# Patient Record
Sex: FEMALE | Race: WHITE | NOT HISPANIC OR LATINO | Employment: OTHER | URBAN - METROPOLITAN AREA
[De-identification: names, ages, dates, MRNs, and addresses within clinical notes are randomized per-mention and may not be internally consistent; named-entity substitution may affect disease eponyms.]

---

## 2023-12-12 DIAGNOSIS — I10 ESSENTIAL (PRIMARY) HYPERTENSION: ICD-10-CM

## 2023-12-12 RX ORDER — HYDROCHLOROTHIAZIDE 12.5 MG/1
12.5 TABLET ORAL DAILY
Qty: 90 TABLET | Refills: 2 | Status: SHIPPED | OUTPATIENT
Start: 2023-12-12 | End: 2024-04-14 | Stop reason: HOSPADM

## 2023-12-12 RX ORDER — IRBESARTAN 300 MG/1
300 TABLET ORAL DAILY
Qty: 90 TABLET | Refills: 3 | Status: SHIPPED | OUTPATIENT
Start: 2023-12-12

## 2023-12-27 ENCOUNTER — OFFICE VISIT (OUTPATIENT)
Dept: PRIMARY CARE | Facility: CLINIC | Age: 63
End: 2023-12-27
Payer: COMMERCIAL

## 2023-12-27 VITALS
SYSTOLIC BLOOD PRESSURE: 126 MMHG | OXYGEN SATURATION: 97 % | HEIGHT: 64 IN | HEART RATE: 86 BPM | BODY MASS INDEX: 50.02 KG/M2 | TEMPERATURE: 97.8 F | RESPIRATION RATE: 16 BRPM | DIASTOLIC BLOOD PRESSURE: 80 MMHG | WEIGHT: 293 LBS

## 2023-12-27 DIAGNOSIS — I10 HYPERTENSION, ESSENTIAL, BENIGN: Primary | ICD-10-CM

## 2023-12-27 PROBLEM — D35.00 ADRENAL ADENOMA: Status: ACTIVE | Noted: 2023-12-27

## 2023-12-27 PROBLEM — N95.0 POST-MENOPAUSAL BLEEDING: Status: RESOLVED | Noted: 2023-12-27 | Resolved: 2023-12-27

## 2023-12-27 PROCEDURE — 3074F SYST BP LT 130 MM HG: CPT | Performed by: FAMILY MEDICINE

## 2023-12-27 PROCEDURE — 99213 OFFICE O/P EST LOW 20 MIN: CPT | Performed by: FAMILY MEDICINE

## 2023-12-27 PROCEDURE — 1036F TOBACCO NON-USER: CPT | Performed by: FAMILY MEDICINE

## 2023-12-27 PROCEDURE — 3079F DIAST BP 80-89 MM HG: CPT | Performed by: FAMILY MEDICINE

## 2023-12-27 ASSESSMENT — ENCOUNTER SYMPTOMS
CARDIOVASCULAR NEGATIVE: 1
PSYCHIATRIC NEGATIVE: 1
GASTROINTESTINAL NEGATIVE: 1
NEUROLOGICAL NEGATIVE: 1
EYES NEGATIVE: 1
ENDOCRINE NEGATIVE: 1
HYPERTENSION: 1
RESPIRATORY NEGATIVE: 1
CONSTITUTIONAL NEGATIVE: 1

## 2023-12-27 NOTE — PROGRESS NOTES
"Subjective     Patient ID: Libertad Rees is a 63 y.o. female who presents for Hypertension.  Hypertension  This is a chronic problem. The current episode started more than 1 year ago. The problem is unchanged. The problem is controlled. There are no known risk factors for coronary artery disease. The current treatment provides significant improvement.       Review of Systems   Constitutional: Negative.    HENT: Negative.     Eyes: Negative.    Respiratory: Negative.     Cardiovascular: Negative.    Gastrointestinal: Negative.    Endocrine: Negative.    Genitourinary: Negative.    Skin: Negative.    Neurological: Negative.    Psychiatric/Behavioral: Negative.         Objective     Vitals:    12/27/23 1512   Weight: 140 kg (309 lb)   Height: 1.626 m (5' 4\")        Current Outpatient Medications   Medication Instructions    hydroCHLOROthiazide (HYDRODIURIL) 12.5 mg, oral, Daily    irbesartan (AVAPRO) 300 mg, oral, Daily        Physical Exam  Constitutional:       Appearance: Normal appearance.   HENT:      Head: Normocephalic and atraumatic.      Right Ear: Tympanic membrane normal.      Left Ear: Tympanic membrane normal.      Nose: Nose normal.      Mouth/Throat:      Mouth: Mucous membranes are moist.   Cardiovascular:      Rate and Rhythm: Normal rate and regular rhythm.   Pulmonary:      Effort: Pulmonary effort is normal.      Breath sounds: Normal breath sounds.   Neurological:      Mental Status: She is alert.         Assessment/Plan   Problem List Items Addressed This Visit             ICD-10-CM    Hypertension, essential, benign - Primary I10   Continue current medications. Refills sent as needed.                        "

## 2024-01-19 ENCOUNTER — APPOINTMENT (OUTPATIENT)
Dept: PRIMARY CARE | Facility: CLINIC | Age: 64
End: 2024-01-19
Payer: COMMERCIAL

## 2024-02-15 ENCOUNTER — OFFICE VISIT (OUTPATIENT)
Dept: OBSTETRICS AND GYNECOLOGY | Facility: CLINIC | Age: 64
End: 2024-02-15
Payer: COMMERCIAL

## 2024-02-15 VITALS
BODY MASS INDEX: 50.02 KG/M2 | DIASTOLIC BLOOD PRESSURE: 80 MMHG | WEIGHT: 293 LBS | HEIGHT: 64 IN | SYSTOLIC BLOOD PRESSURE: 144 MMHG

## 2024-02-15 DIAGNOSIS — N95.0 PMB (POSTMENOPAUSAL BLEEDING): ICD-10-CM

## 2024-02-15 PROCEDURE — 99213 OFFICE O/P EST LOW 20 MIN: CPT | Performed by: OBSTETRICS & GYNECOLOGY

## 2024-02-15 PROCEDURE — 1036F TOBACCO NON-USER: CPT | Performed by: OBSTETRICS & GYNECOLOGY

## 2024-02-15 PROCEDURE — 58100 BIOPSY OF UTERUS LINING: CPT | Performed by: OBSTETRICS & GYNECOLOGY

## 2024-02-15 PROCEDURE — 3079F DIAST BP 80-89 MM HG: CPT | Performed by: OBSTETRICS & GYNECOLOGY

## 2024-02-15 PROCEDURE — 3077F SYST BP >= 140 MM HG: CPT | Performed by: OBSTETRICS & GYNECOLOGY

## 2024-02-15 RX ORDER — MISOPROSTOL 200 UG/1
TABLET ORAL
Qty: 2 TABLET | Refills: 0 | Status: SHIPPED | OUTPATIENT
Start: 2024-02-15 | End: 2024-04-25 | Stop reason: HOSPADM

## 2024-02-15 NOTE — PROGRESS NOTES
Patient ID: Libertad Rees is a 63 y.o. female.    Hx of PCOS and irreg menses up until late 50s    Started bleeding in Dec 2 weeks . Then cont into Jan 2 more weeks.      ProceduresCould not do EMB due to cervix stenosis. Tried x 3 today w no success.    Will order 400 mg cytotec. Return for me to insert then next day try EMB.  Pt agrees.    Melony Dorantes MD

## 2024-02-28 ENCOUNTER — APPOINTMENT (OUTPATIENT)
Dept: RADIOLOGY | Facility: HOSPITAL | Age: 64
End: 2024-02-28
Payer: COMMERCIAL

## 2024-03-04 ENCOUNTER — OFFICE VISIT (OUTPATIENT)
Dept: OBSTETRICS AND GYNECOLOGY | Facility: CLINIC | Age: 64
End: 2024-03-04
Payer: COMMERCIAL

## 2024-03-04 VITALS
BODY MASS INDEX: 50.02 KG/M2 | DIASTOLIC BLOOD PRESSURE: 84 MMHG | HEIGHT: 64 IN | WEIGHT: 293 LBS | SYSTOLIC BLOOD PRESSURE: 134 MMHG

## 2024-03-04 DIAGNOSIS — N95.0 PMB (POSTMENOPAUSAL BLEEDING): Primary | ICD-10-CM

## 2024-03-04 PROCEDURE — 1036F TOBACCO NON-USER: CPT | Performed by: OBSTETRICS & GYNECOLOGY

## 2024-03-04 PROCEDURE — 99212 OFFICE O/P EST SF 10 MIN: CPT | Performed by: OBSTETRICS & GYNECOLOGY

## 2024-03-04 PROCEDURE — 3075F SYST BP GE 130 - 139MM HG: CPT | Performed by: OBSTETRICS & GYNECOLOGY

## 2024-03-04 PROCEDURE — 3079F DIAST BP 80-89 MM HG: CPT | Performed by: OBSTETRICS & GYNECOLOGY

## 2024-03-04 NOTE — PROGRESS NOTES
Subjective   Patient ID: Libertad Rees is a 64 y.o. female who presents for Postmenopausal bleeding.     Started bleeding in Dec 2 weeks . Then cont into Jan 2 more weeks.  Had cervical stenosis last visit and could not get endometrial bx done.   Picked up rx of cytotec tablets fr her pharmacy and here today to have  Insert of Cytotec for EMB tomorrow morning.       Objective   Physical Exam  Speculum inserted and two cytotec tablets 200 mcg each inserted.     Assessment/Plan   Diagnoses and all orders for this visit:  PMB (postmenopausal bleeding)     Follow up scheduled in am for EMB     MD Felisa Garcia CMA 03/04/24 4:42 PM

## 2024-03-05 ENCOUNTER — PREP FOR PROCEDURE (OUTPATIENT)
Dept: OBSTETRICS AND GYNECOLOGY | Facility: CLINIC | Age: 64
End: 2024-03-05

## 2024-03-05 ENCOUNTER — OFFICE VISIT (OUTPATIENT)
Dept: OBSTETRICS AND GYNECOLOGY | Facility: CLINIC | Age: 64
End: 2024-03-05
Payer: COMMERCIAL

## 2024-03-05 VITALS
DIASTOLIC BLOOD PRESSURE: 80 MMHG | SYSTOLIC BLOOD PRESSURE: 142 MMHG | HEIGHT: 64 IN | WEIGHT: 293 LBS | BODY MASS INDEX: 50.02 KG/M2

## 2024-03-05 DIAGNOSIS — N95.0 POSTMENOPAUSAL BLEEDING: Primary | ICD-10-CM

## 2024-03-05 PROCEDURE — 3079F DIAST BP 80-89 MM HG: CPT | Performed by: OBSTETRICS & GYNECOLOGY

## 2024-03-05 PROCEDURE — 99212 OFFICE O/P EST SF 10 MIN: CPT | Performed by: OBSTETRICS & GYNECOLOGY

## 2024-03-05 PROCEDURE — 3077F SYST BP >= 140 MM HG: CPT | Performed by: OBSTETRICS & GYNECOLOGY

## 2024-03-05 PROCEDURE — 3008F BODY MASS INDEX DOCD: CPT | Performed by: OBSTETRICS & GYNECOLOGY

## 2024-03-05 PROCEDURE — 1036F TOBACCO NON-USER: CPT | Performed by: OBSTETRICS & GYNECOLOGY

## 2024-03-05 NOTE — PROGRESS NOTES
Patient ID: Libertad Rees is a 64 y.o. female.  Has 2 months of postmenopausal bleeding. Has not yet done the pelvic US I ordered, enc to do so .  I placed  cytotec yest in vagina in hopes of cervical dilation. Last try had cervical stenosis.    Tried again x 3 to dilate cervix for pipelle and was unsuccessful.  Firm stenotic scar tissue and BMI limits exam in office. Cervix is high up in vagina approx 6 inches from introitus.    Unable to do / cancelled  Endometrial biopsy    Date/Time: 3/5/2024 8:33 AM    Performed by: Melony Dorantes MD  Authorized by: Melony Dorantes MD        PLAN:   Schedule D&C, Hysteroscopy  Needs to get pelvic Us DONE .    Pt aware and agrees.    Melony Dorantes MD

## 2024-03-08 ENCOUNTER — APPOINTMENT (OUTPATIENT)
Dept: PRIMARY CARE | Facility: CLINIC | Age: 64
End: 2024-03-08
Payer: COMMERCIAL

## 2024-03-13 ENCOUNTER — APPOINTMENT (OUTPATIENT)
Dept: RADIOLOGY | Facility: HOSPITAL | Age: 64
End: 2024-03-13
Payer: COMMERCIAL

## 2024-03-14 ENCOUNTER — HOSPITAL ENCOUNTER (OUTPATIENT)
Facility: HOSPITAL | Age: 64
Setting detail: OUTPATIENT SURGERY
End: 2024-03-14
Attending: OBSTETRICS & GYNECOLOGY | Admitting: OBSTETRICS & GYNECOLOGY
Payer: COMMERCIAL

## 2024-03-19 ENCOUNTER — HOSPITAL ENCOUNTER (OUTPATIENT)
Dept: RADIOLOGY | Facility: CLINIC | Age: 64
Discharge: HOME | End: 2024-03-19
Payer: COMMERCIAL

## 2024-03-19 DIAGNOSIS — N95.0 PMB (POSTMENOPAUSAL BLEEDING): ICD-10-CM

## 2024-03-19 PROCEDURE — 76830 TRANSVAGINAL US NON-OB: CPT

## 2024-03-19 PROCEDURE — 76830 TRANSVAGINAL US NON-OB: CPT | Performed by: RADIOLOGY

## 2024-03-19 PROCEDURE — 76856 US EXAM PELVIC COMPLETE: CPT | Performed by: RADIOLOGY

## 2024-03-29 ENCOUNTER — OFFICE VISIT (OUTPATIENT)
Dept: PRIMARY CARE | Facility: CLINIC | Age: 64
End: 2024-03-29
Payer: COMMERCIAL

## 2024-03-29 VITALS
HEIGHT: 64 IN | HEART RATE: 88 BPM | SYSTOLIC BLOOD PRESSURE: 134 MMHG | RESPIRATION RATE: 16 BRPM | WEIGHT: 293 LBS | DIASTOLIC BLOOD PRESSURE: 82 MMHG | OXYGEN SATURATION: 98 % | BODY MASS INDEX: 50.02 KG/M2 | TEMPERATURE: 97.8 F

## 2024-03-29 DIAGNOSIS — Z12.31 ENCOUNTER FOR SCREENING MAMMOGRAM FOR BREAST CANCER: ICD-10-CM

## 2024-03-29 DIAGNOSIS — Z00.00 HEALTHCARE MAINTENANCE: Primary | ICD-10-CM

## 2024-03-29 DIAGNOSIS — Z12.11 ENCOUNTER FOR SCREENING FOR MALIGNANT NEOPLASM OF COLON: ICD-10-CM

## 2024-03-29 LAB
POC APPEARANCE, URINE: CLEAR
POC BILIRUBIN, URINE: NEGATIVE
POC BLOOD, URINE: NEGATIVE
POC COLOR, URINE: YELLOW
POC GLUCOSE, URINE: NEGATIVE MG/DL
POC KETONES, URINE: NEGATIVE MG/DL
POC LEUKOCYTES, URINE: NEGATIVE
POC NITRITE,URINE: NEGATIVE
POC PH, URINE: 6 PH
POC PROTEIN, URINE: NEGATIVE MG/DL
POC SPECIFIC GRAVITY, URINE: 1.01
POC UROBILINOGEN, URINE: 0.2 EU/DL

## 2024-03-29 PROCEDURE — 81002 URINALYSIS NONAUTO W/O SCOPE: CPT | Performed by: FAMILY MEDICINE

## 2024-03-29 PROCEDURE — 3008F BODY MASS INDEX DOCD: CPT | Performed by: FAMILY MEDICINE

## 2024-03-29 PROCEDURE — 1036F TOBACCO NON-USER: CPT | Performed by: FAMILY MEDICINE

## 2024-03-29 PROCEDURE — 93000 ELECTROCARDIOGRAM COMPLETE: CPT | Performed by: FAMILY MEDICINE

## 2024-03-29 PROCEDURE — 3075F SYST BP GE 130 - 139MM HG: CPT | Performed by: FAMILY MEDICINE

## 2024-03-29 PROCEDURE — 3079F DIAST BP 80-89 MM HG: CPT | Performed by: FAMILY MEDICINE

## 2024-03-29 PROCEDURE — 99396 PREV VISIT EST AGE 40-64: CPT | Performed by: FAMILY MEDICINE

## 2024-03-29 ASSESSMENT — ENCOUNTER SYMPTOMS
GASTROINTESTINAL NEGATIVE: 1
CARDIOVASCULAR NEGATIVE: 1
CONSTITUTIONAL NEGATIVE: 1
PSYCHIATRIC NEGATIVE: 1
ENDOCRINE NEGATIVE: 1
EYES NEGATIVE: 1
RESPIRATORY NEGATIVE: 1
NEUROLOGICAL NEGATIVE: 1

## 2024-03-29 ASSESSMENT — COLUMBIA-SUICIDE SEVERITY RATING SCALE - C-SSRS
6. HAVE YOU EVER DONE ANYTHING, STARTED TO DO ANYTHING, OR PREPARED TO DO ANYTHING TO END YOUR LIFE?: NO
1. IN THE PAST MONTH, HAVE YOU WISHED YOU WERE DEAD OR WISHED YOU COULD GO TO SLEEP AND NOT WAKE UP?: NO
2. HAVE YOU ACTUALLY HAD ANY THOUGHTS OF KILLING YOURSELF?: NO

## 2024-03-29 ASSESSMENT — PATIENT HEALTH QUESTIONNAIRE - PHQ9
2. FEELING DOWN, DEPRESSED OR HOPELESS: NOT AT ALL
1. LITTLE INTEREST OR PLEASURE IN DOING THINGS: NOT AT ALL
SUM OF ALL RESPONSES TO PHQ9 QUESTIONS 1 AND 2: 0

## 2024-03-29 NOTE — PROGRESS NOTES
"Subjective     Patient ID: Libertad Rees is a 64 y.o. female who presents for Annual Exam.  HPI  Having a D&C on 4/25.    Review of Systems   Constitutional: Negative.    HENT: Negative.     Eyes: Negative.    Respiratory: Negative.     Cardiovascular: Negative.    Gastrointestinal: Negative.    Endocrine: Negative.    Genitourinary: Negative.    Skin: Negative.    Neurological: Negative.    Psychiatric/Behavioral: Negative.         Objective     Vitals:    03/29/24 1057   BP: 134/82   BP Location: Left arm   Patient Position: Sitting   Pulse: 88   Resp: 16   Temp: 36.6 °C (97.8 °F)   SpO2: 98%   Weight: 137 kg (301 lb 1.6 oz)   Height: 1.626 m (5' 4\")        Current Outpatient Medications   Medication Instructions    hydroCHLOROthiazide (MICROZIDE) 12.5 mg, oral, Daily    irbesartan (AVAPRO) 300 mg, oral, Daily    miSOPROStoL (Cytotec) 200 mcg tablet Insert 2 tablets in vagina prior to procedure        Physical Exam  Constitutional:       General: She is not in acute distress.     Appearance: Normal appearance.   HENT:      Head: Normocephalic and atraumatic.      Right Ear: Tympanic membrane normal.      Left Ear: Tympanic membrane normal.      Nose: Nose normal.      Mouth/Throat:      Pharynx: Oropharynx is clear.   Eyes:      Conjunctiva/sclera: Conjunctivae normal.      Pupils: Pupils are equal, round, and reactive to light.   Neck:      Vascular: No carotid bruit.   Cardiovascular:      Rate and Rhythm: Normal rate and regular rhythm.      Heart sounds: Normal heart sounds.   Pulmonary:      Effort: Pulmonary effort is normal.      Breath sounds: Normal breath sounds.   Abdominal:      General: Bowel sounds are normal.      Palpations: Abdomen is soft.   Musculoskeletal:      Cervical back: Neck supple. No tenderness.   Skin:     General: Skin is warm and dry.   Neurological:      General: No focal deficit present.      Mental Status: She is alert.   Psychiatric:         Mood and Affect: Mood normal.        "  Behavior: Behavior normal.         Assessment/Plan   Problem List Items Addressed This Visit    None  Visit Diagnoses         Codes    Healthcare maintenance    -  Primary Z00.00    Relevant Orders    ECG 12 lead (Clinic Performed) (Completed)    POCT UA (nonautomated) manually resulted (Completed)    CBC    Comprehensive Metabolic Panel    Lipid Panel    Encounter for screening for malignant neoplasm of colon     Z12.11    Relevant Orders    Cologuard® colon cancer screening    Encounter for screening mammogram for breast cancer     Z12.31    Relevant Orders    BI mammo bilateral screening tomosynthesis

## 2024-04-11 ENCOUNTER — PRE-ADMISSION TESTING (OUTPATIENT)
Dept: PREADMISSION TESTING | Facility: HOSPITAL | Age: 64
End: 2024-04-11
Payer: COMMERCIAL

## 2024-04-11 ENCOUNTER — LAB (OUTPATIENT)
Dept: LAB | Facility: LAB | Age: 64
End: 2024-04-11
Payer: COMMERCIAL

## 2024-04-11 VITALS
RESPIRATION RATE: 16 BRPM | DIASTOLIC BLOOD PRESSURE: 84 MMHG | OXYGEN SATURATION: 93 % | SYSTOLIC BLOOD PRESSURE: 178 MMHG | HEART RATE: 88 BPM | HEIGHT: 64 IN | BODY MASS INDEX: 50.02 KG/M2 | WEIGHT: 293 LBS | TEMPERATURE: 98.1 F

## 2024-04-11 DIAGNOSIS — N95.0 POSTMENOPAUSAL BLEEDING: ICD-10-CM

## 2024-04-11 DIAGNOSIS — Z01.818 PRE-OP TESTING: Primary | ICD-10-CM

## 2024-04-11 DIAGNOSIS — Z00.00 HEALTHCARE MAINTENANCE: ICD-10-CM

## 2024-04-11 LAB
ALBUMIN SERPL BCP-MCNC: 4.2 G/DL (ref 3.4–5)
ALP SERPL-CCNC: 103 U/L (ref 33–136)
ALT SERPL W P-5'-P-CCNC: 14 U/L (ref 7–45)
ANION GAP SERPL CALC-SCNC: 13 MMOL/L (ref 10–20)
AST SERPL W P-5'-P-CCNC: 16 U/L (ref 9–39)
BILIRUB SERPL-MCNC: 1.9 MG/DL (ref 0–1.2)
BUN SERPL-MCNC: 15 MG/DL (ref 6–23)
CALCIUM SERPL-MCNC: 10.2 MG/DL (ref 8.6–10.3)
CHLORIDE SERPL-SCNC: 100 MMOL/L (ref 98–107)
CHOLEST SERPL-MCNC: 214 MG/DL (ref 0–199)
CHOLESTEROL/HDL RATIO: 4.9
CO2 SERPL-SCNC: 31 MMOL/L (ref 21–32)
CREAT SERPL-MCNC: 0.6 MG/DL (ref 0.5–1.05)
EGFRCR SERPLBLD CKD-EPI 2021: >90 ML/MIN/1.73M*2
ERYTHROCYTE [DISTWIDTH] IN BLOOD BY AUTOMATED COUNT: 13.7 % (ref 11.5–14.5)
GLUCOSE SERPL-MCNC: 114 MG/DL (ref 74–99)
HCT VFR BLD AUTO: 48.6 % (ref 36–46)
HDLC SERPL-MCNC: 44 MG/DL
HGB BLD-MCNC: 15.4 G/DL (ref 12–16)
LDLC SERPL CALC-MCNC: 145 MG/DL
MCH RBC QN AUTO: 28.5 PG (ref 26–34)
MCHC RBC AUTO-ENTMCNC: 31.7 G/DL (ref 32–36)
MCV RBC AUTO: 90 FL (ref 80–100)
NON HDL CHOLESTEROL: 170 MG/DL (ref 0–149)
NRBC BLD-RTO: 0 /100 WBCS (ref 0–0)
PLATELET # BLD AUTO: 325 X10*3/UL (ref 150–450)
POTASSIUM SERPL-SCNC: 3.7 MMOL/L (ref 3.5–5.3)
PROT SERPL-MCNC: 7.5 G/DL (ref 6.4–8.2)
RBC # BLD AUTO: 5.41 X10*6/UL (ref 4–5.2)
SODIUM SERPL-SCNC: 140 MMOL/L (ref 136–145)
TRIGL SERPL-MCNC: 125 MG/DL (ref 0–149)
VLDL: 25 MG/DL (ref 0–40)
WBC # BLD AUTO: 8.8 X10*3/UL (ref 4.4–11.3)

## 2024-04-11 PROCEDURE — 36415 COLL VENOUS BLD VENIPUNCTURE: CPT

## 2024-04-11 PROCEDURE — 99203 OFFICE O/P NEW LOW 30 MIN: CPT | Performed by: NURSE PRACTITIONER

## 2024-04-11 PROCEDURE — 80053 COMPREHEN METABOLIC PANEL: CPT

## 2024-04-11 PROCEDURE — 86850 RBC ANTIBODY SCREEN: CPT

## 2024-04-11 PROCEDURE — 80061 LIPID PANEL: CPT

## 2024-04-11 PROCEDURE — 86901 BLOOD TYPING SEROLOGIC RH(D): CPT

## 2024-04-11 PROCEDURE — 86900 BLOOD TYPING SEROLOGIC ABO: CPT

## 2024-04-11 PROCEDURE — 85027 COMPLETE CBC AUTOMATED: CPT

## 2024-04-11 ASSESSMENT — PAIN - FUNCTIONAL ASSESSMENT: PAIN_FUNCTIONAL_ASSESSMENT: 0-10

## 2024-04-11 ASSESSMENT — DUKE ACTIVITY SCORE INDEX (DASI)
CAN YOU CLIMB A FLIGHT OF STAIRS OR WALK UP A HILL: YES
CAN YOU HAVE SEXUAL RELATIONS: NO
DASI METS SCORE: 6.6
CAN YOU RUN A SHORT DISTANCE: NO
TOTAL_SCORE: 31.45
CAN YOU DO MODERATE WORK AROUND THE HOUSE LIKE VACUUMING, SWEEPING FLOORS OR CARRYING GROCERIES: YES
CAN YOU DO LIGHT WORK AROUND THE HOUSE LIKE DUSTING OR WASHING DISHES: YES
CAN YOU WALK INDOORS, SUCH AS AROUND YOUR HOUSE: YES
CAN YOU TAKE CARE OF YOURSELF (EAT, DRESS, BATHE, OR USE TOILET): YES
CAN YOU DO HEAVY WORK AROUND THE HOUSE LIKE SCRUBBING FLOORS OR LIFTING AND MOVING HEAVY FURNITURE: YES
CAN YOU PARTICIPATE IN STRENOUS SPORTS LIKE SWIMMING, SINGLES TENNIS, FOOTBALL, BASKETBALL, OR SKIING: NO
CAN YOU PARTICIPATE IN MODERATE RECREATIONAL ACTIVITIES LIKE GOLF, BOWLING, DANCING, DOUBLES TENNIS OR THROWING A BASEBALL OR FOOTBALL: NO
CAN YOU WALK A BLOCK OR TWO ON LEVEL GROUND: YES
CAN YOU DO YARD WORK LIKE RAKING LEAVES, WEEDING OR PUSHING A MOWER: YES

## 2024-04-11 ASSESSMENT — CHADS2 SCORE
PRIOR STROKE OR TIA OR THROMBOEMBOLISM: NO
DIABETES: NO
HYPERTENSION: YES
CHF: NO
AGE GREATER THAN OR EQUAL TO 75: NO
CHADS2 SCORE: 1

## 2024-04-11 ASSESSMENT — ACTIVITIES OF DAILY LIVING (ADL): ADL_SCORE: 0

## 2024-04-11 ASSESSMENT — PAIN SCALES - GENERAL: PAINLEVEL_OUTOF10: 0 - NO PAIN

## 2024-04-11 ASSESSMENT — LIFESTYLE VARIABLES: SMOKING_STATUS: NONSMOKER

## 2024-04-11 NOTE — PREPROCEDURE INSTRUCTIONS
Medication List            Accurate as of April 11, 2024  1:52 PM. Always use your most recent med list.                hydroCHLOROthiazide 12.5 mg tablet  Commonly known as: Microzide  TAKE 1 TABLET BY MOUTH EVERY DAY  Medication Adjustments for Surgery: Take morning of surgery with sip of water, no other fluids     irbesartan 300 mg tablet  Commonly known as: Avapro  TAKE 1 TABLET BY MOUTH EVERY DAY  Medication Adjustments for Surgery: Other (Comment)  Notes to patient: HOLD any evening dose the night before the day of surgery  HOLD the day of surgery     miSOPROStoL 200 mcg tablet  Commonly known as: Cytotec  Insert 2 tablets in vagina prior to procedure  Medication Adjustments for Surgery: Continue until night before surgery                                  PRE-OPERATIVE INSTRUCTIONS    You will receive notification one business day prior to your procedure to confirm your arrival time. It is important that you answer your phone and/or check your messages during this time. If you do not hear from the surgery center by 5 pm. the day before your procedure, please call [phone].     Please enter the building through the Outpatient entrance and take the elevator off the lobby to the 2nd floor then check in at the Outpatient Surgery desk on the 2nd floor.    INSTRUCTIONS:  Talk to your surgeon for instructions if you should stop your aspirin, blood thinner, or diabetes medicines.  DO NOT take any multivitamins or over the counter supplements for 7-10 days before surgery.  If not being admitted, you must have an adult immediately available to drive you home after surgery. We also highly recommend you have someone stay with you for the entire day and night of your surgery.  For children having surgery, a parent or legal guardian must accompany them to the surgery center. If this is not possible, please call [phone] to make additional arrangements.  For adults who are unable to consent or make medical  decisions for themselves, a legal guardian or Power of  must accompany them to the surgery center. If this is not possible, please call [phone] to make additional arrangements.  Wear comfortable, loose fitting clothing.  All jewelry and piercings must be removed. If you are unable to remove an item or have a dermal piercing, please be sure to tell the nurse when you arrive for surgery.  Nail polish and make-up must be removed.  Avoid smoking or consuming alcohol for 24 hours before surgery.  To help prevent infection, please take a shower/bath and wash your hair the night before and/or morning of surgery (or follow other specific bathing instructions provided).    Preoperative Fasting Guidelines    Why must I stop eating and drinking near surgery time?  With sedation, food or liquid in your stomach can enter your lungs causing serious complications  Increases nausea and vomiting    When do I need to stop eating and drinking before my surgery?  Do not eat any solid food after midnight the night before your surgery/procedure.  You may have up to TEN ounces of clear liquid until TWO hours before your instructed arrival time to the hospital.  This includes water, black tea/coffee, (no milk or cream) apple juice, and electrolyte drinks (Gatorade).   You may chew gum until TWO hours before your surgery/procedure    If you have any questions or concerns, please call Pre-Admission Testing at [phone].                     Home Preoperative Antibacterial Shower with Chlorhexidine gluconate (CHG)     What is a home preoperative antibacterial shower?  This shower is a way of cleaning the skin with a germ killing solution before surgery. The solution contains chlorhexidine gluconate, commonly known as CHG. CHG is a skin cleanser with germ killing ability. Let your doctor know if you are allergic to chlorhexidine.    Why do I need to take a preoperative antibacterial shower?  Skin is not sterile. It is best to  try to make your skin as free of germs as possible before surgery. Proper cleansing with a germ killing soap before surgery can lower the number of germs on your skin. This helps to reduce the risk of infection at the surgical site. Following the instructions listed below will help you prepare your skin for surgery.    How do I use the solution?  Begin using your CHG soap the night before and again the morning of your procedure.   Do not shave the day before or day of surgery.  Remove all jewelry until after surgery. Take off rings and take out all body-piercing jewelry.  Wash your face and hair with normal soap and shampoo before you use the CHG soap.  Apply the CHG solution to a clean wet washcloth. Move away from the water to avoid premature rinsing of the CHG soap as you are applying. Firmly lather your entire body from the neck down. Do not use CHG on your face, eyes, ears, or genitals.   Pay special attention to the area where your incisions will be located.  Do not scrub your skin too hard.  It is important to allow the CHG soap to sit on your skin for 3-5 minutes.  Rinse the solution off your body completely. Do not wash with your normal soap after the CHG soap solution.  Pat yourself dry with a clean, soft towel.  Do not apply powders, lotions or deodorants as these might block how the CHG soap works.   Dress in clean clothing.  Be sure to sleep with clean, freshly laundered sheets.  Be aware that CHG can cause stains on fabric. Rinse your washcloth and other linens that have contact with CHG completely. Use only non-chlorine detergents to launder the items used.

## 2024-04-11 NOTE — CPM/PAT H&P
CPM/PAT Evaluation       Name: Libertad Rees (Libertad Rees)  /Age: 1960/64 y.o.     In-Person       Chief Complaint: post menopausal bleeding    HPI    RICCI is a 63 yo female who has had issues with post menopausal bleeding over one year that resolved spontaneously and now more recently bleeding returned. She was seen by OBGYN and scheduled for hysteroscopy for further evaluation. She denies any known uterine polyps or fibroids. Denies any recent UTIs. Otherwise denies any recent illness, fever/chills, chest pains or shortness of breath.     Past Medical History:   Diagnosis Date    Adrenal adenoma     Adverse effect of anesthesia     low oxygen levels postop    Hypertension     Lipoma     Nephrolithiasis     Post-menopausal bleeding 2023     PCP: Dr. Levine    Past Surgical History:   Procedure Laterality Date    LIPOMA RESECTION      LITHOTRIPSY      OTHER SURGICAL HISTORY  2022    Thoracotomy    SHOULDER SURGERY Right        Patient  has no history on file for sexual activity.    Family History   Problem Relation Name Age of Onset    Hypertension Mother      Stroke Mother      Diabetes Brother      Hypertension Brother         Allergies   Allergen Reactions    Banana Unknown    Kiwi Unknown    Latex Rash       Prior to Admission medications    Medication Sig Start Date End Date Taking? Authorizing Provider   hydroCHLOROthiazide (HYDRODiuril) 12.5 mg tablet TAKE 1 TABLET BY MOUTH EVERY DAY 23   Stephenie Levine DO   irbesartan (Avapro) 300 mg tablet TAKE 1 TABLET BY MOUTH EVERY DAY 23   Stephenie Levine DO   miSOPROStoL (Cytotec) 200 mcg tablet Insert 2 tablets in vagina prior to procedure 2/15/24   Melony Dorantes MD        PAT ROS   Constitutional: Negative for fever, chills, or sweats   ENMT: Negative for nasal discharge, congestion, ear pain, mouth pain, throat pain   Respiratory: Negative for cough, wheezing, shortness of breath   Cardiac: Negative for chest pain,  dyspnea on exertion, palpitations   Gastrointestinal: Negative for nausea, vomiting, diarrhea, constipation, abdominal pain    Genitourinary: Negative for dysuria, flank pains, frequency, hematuria   Positive intermittent uterine bleeding      Musculoskeletal: Negative for decreased ROM, swelling, weakness; Positive occasional left mid back pains    Neurological: Negative for dizziness, confusion, headache  Psychiatric: Negative for mood changes   Skin: Negative for itching, rash, ulcer    Hematologic/Lymph: Negative for bruising, easy bleeding  Allergic/Immunologic: Negative itching, sneezing, swelling      Physical Exam  Constitutional:       Appearance: She is obese.   HENT:      Head: Normocephalic.      Mouth/Throat:      Mouth: Mucous membranes are moist.   Eyes:      Extraocular Movements: Extraocular movements intact.   Cardiovascular:      Rate and Rhythm: Normal rate and regular rhythm.   Pulmonary:      Effort: Pulmonary effort is normal.      Breath sounds: Normal breath sounds.   Abdominal:      General: Abdomen is flat.      Palpations: Abdomen is soft.   Musculoskeletal:         General: Normal range of motion.      Cervical back: Normal range of motion.   Skin:     General: Skin is warm and dry.   Neurological:      General: No focal deficit present.      Mental Status: She is alert.   Psychiatric:         Mood and Affect: Mood normal.          PAT AIRWAY:   Airway:     Neck ROM::  Full   Missing teeth from extractions  normal      Anesthesia:  Patient denies any anesthesia complications.     Visit Vitals  /84   Pulse 88   Temp 36.7 °C (98.1 °F) (Temporal)   Resp 16       DASI Risk Score      Flowsheet Row Most Recent Value   DASI SCORE 31.45   METS Score (Will be calculated only when all the questions are answered) 6.6          Caprini DVT Assessment      Flowsheet Row Most Recent Value   DVT Score 10   Current Status Major surgery planned, including arthroscopic and laproscopic (1-2 hours)    History Previous malignancy, Prior major surgery   Age 60-75 years   BMI Greater than 50 (Venous stasis syndrome)          Modified Frailty Index      Flowsheet Row Most Recent Value   Modified Frailty Index Calculator .0909          CHADS2 Stroke Risk  Current as of 8 minutes ago        N/A 3 to 100%: High Risk   2 to < 3%: Medium Risk   0 to < 2%: Low Risk     Last Change: N/A          This score determines the patient's risk of having a stroke if the patient has atrial fibrillation.        This score is not applicable to this patient. Components are not calculated.          Revised Cardiac Risk Index    No data to display       Apfel Simplified Score    No data to display       Risk Analysis Index Results This Encounter         4/11/2024  1323             FU Cancer History: Patient does not indicate history of cancer    Total Risk Analysis Index Score Without Cancer: 19    Total Risk Analysis Index Score: 19          Stop Bang Score      Flowsheet Row Most Recent Value   Do you snore loudly? 1   Do you often feel tired or fatigued after your sleep? 1   Has anyone ever observed you stop breathing in your sleep? 0   Do you have or are you being treated for high blood pressure? 1   Recent BMI (Calculated) 51.7   Is BMI greater than 35 kg/m2? 1=Yes   Age older than 50 years old? 1=Yes   Is your neck circumference greater than 17 inches (Male) or 16 inches (Female)? 1   Gender - Male 0=No   STOP-BANG Total Score 6            Assessment and Plan:     63 yo female scheduled for hysteroscopy on 4/25/2024 with Dr. Dorantes. Blood work ordered. EKG completed on 3/29/2024 and on file under cardiology. Otherwise no further orders indicated.     See risk scores as previously documented.

## 2024-04-12 LAB
ABO GROUP (TYPE) IN BLOOD: NORMAL
ANTIBODY SCREEN: NORMAL
RH FACTOR (ANTIGEN D): NORMAL

## 2024-04-13 ENCOUNTER — APPOINTMENT (OUTPATIENT)
Dept: RADIOLOGY | Facility: HOSPITAL | Age: 64
End: 2024-04-13
Payer: COMMERCIAL

## 2024-04-13 ENCOUNTER — HOSPITAL ENCOUNTER (OUTPATIENT)
Facility: HOSPITAL | Age: 64
Setting detail: OBSERVATION
Discharge: HOME | End: 2024-04-14
Attending: EMERGENCY MEDICINE | Admitting: INTERNAL MEDICINE
Payer: COMMERCIAL

## 2024-04-13 ENCOUNTER — APPOINTMENT (OUTPATIENT)
Dept: CARDIOLOGY | Facility: HOSPITAL | Age: 64
End: 2024-04-13
Payer: COMMERCIAL

## 2024-04-13 DIAGNOSIS — I10 HYPERTENSION, ESSENTIAL, BENIGN: ICD-10-CM

## 2024-04-13 DIAGNOSIS — G47.33 OSA (OBSTRUCTIVE SLEEP APNEA): ICD-10-CM

## 2024-04-13 DIAGNOSIS — R09.02 HYPOXIA: ICD-10-CM

## 2024-04-13 DIAGNOSIS — R09.02 HYPOXEMIA: ICD-10-CM

## 2024-04-13 DIAGNOSIS — N20.1 URETERAL STONE: Primary | ICD-10-CM

## 2024-04-13 DIAGNOSIS — I27.20 PULMONARY HYPERTENSION (MULTI): ICD-10-CM

## 2024-04-13 DIAGNOSIS — G47.30 SLEEP APNEA, UNSPECIFIED: ICD-10-CM

## 2024-04-13 DIAGNOSIS — J96.01 ACUTE RESPIRATORY FAILURE WITH HYPOXEMIA (MULTI): ICD-10-CM

## 2024-04-13 DIAGNOSIS — I51.7 CARDIOMEGALY: ICD-10-CM

## 2024-04-13 DIAGNOSIS — R11.0 NAUSEA: ICD-10-CM

## 2024-04-13 PROBLEM — E66.01 MORBID OBESITY (MULTI): Status: ACTIVE | Noted: 2024-04-13

## 2024-04-13 PROBLEM — N20.0 KIDNEY STONE: Status: ACTIVE | Noted: 2024-04-13

## 2024-04-13 LAB
ALBUMIN SERPL BCP-MCNC: 3.9 G/DL (ref 3.4–5)
ALP SERPL-CCNC: 95 U/L (ref 33–136)
ALT SERPL W P-5'-P-CCNC: 12 U/L (ref 7–45)
ANION GAP SERPL CALC-SCNC: 13 MMOL/L (ref 10–20)
APPEARANCE UR: CLEAR
AST SERPL W P-5'-P-CCNC: 16 U/L (ref 9–39)
BASOPHILS # BLD AUTO: 0.03 X10*3/UL (ref 0–0.1)
BASOPHILS NFR BLD AUTO: 0.2 %
BILIRUB SERPL-MCNC: 0.9 MG/DL (ref 0–1.2)
BILIRUB UR STRIP.AUTO-MCNC: NEGATIVE MG/DL
BUN SERPL-MCNC: 18 MG/DL (ref 6–23)
CALCIUM SERPL-MCNC: 9.7 MG/DL (ref 8.6–10.3)
CARDIAC TROPONIN I PNL SERPL HS: 8 NG/L (ref 0–13)
CARDIAC TROPONIN I PNL SERPL HS: 9 NG/L (ref 0–13)
CHLORIDE SERPL-SCNC: 100 MMOL/L (ref 98–107)
CO2 SERPL-SCNC: 31 MMOL/L (ref 21–32)
COLOR UR: YELLOW
CREAT SERPL-MCNC: 0.64 MG/DL (ref 0.5–1.05)
EGFRCR SERPLBLD CKD-EPI 2021: >90 ML/MIN/1.73M*2
EOSINOPHIL # BLD AUTO: 0.04 X10*3/UL (ref 0–0.7)
EOSINOPHIL NFR BLD AUTO: 0.3 %
ERYTHROCYTE [DISTWIDTH] IN BLOOD BY AUTOMATED COUNT: 13.8 % (ref 11.5–14.5)
FLUAV RNA RESP QL NAA+PROBE: NOT DETECTED
FLUBV RNA RESP QL NAA+PROBE: NOT DETECTED
GLUCOSE SERPL-MCNC: 152 MG/DL (ref 74–99)
GLUCOSE UR STRIP.AUTO-MCNC: NEGATIVE MG/DL
HCT VFR BLD AUTO: 45.4 % (ref 36–46)
HGB BLD-MCNC: 14.6 G/DL (ref 12–16)
HOLD SPECIMEN: NORMAL
IMM GRANULOCYTES # BLD AUTO: 0.04 X10*3/UL (ref 0–0.7)
IMM GRANULOCYTES NFR BLD AUTO: 0.3 % (ref 0–0.9)
KETONES UR STRIP.AUTO-MCNC: NEGATIVE MG/DL
LEUKOCYTE ESTERASE UR QL STRIP.AUTO: NEGATIVE
LIPASE SERPL-CCNC: 10 U/L (ref 9–82)
LYMPHOCYTES # BLD AUTO: 1.54 X10*3/UL (ref 1.2–4.8)
LYMPHOCYTES NFR BLD AUTO: 12.8 %
MCH RBC QN AUTO: 29 PG (ref 26–34)
MCHC RBC AUTO-ENTMCNC: 32.2 G/DL (ref 32–36)
MCV RBC AUTO: 90 FL (ref 80–100)
MONOCYTES # BLD AUTO: 0.7 X10*3/UL (ref 0.1–1)
MONOCYTES NFR BLD AUTO: 5.8 %
NEUTROPHILS # BLD AUTO: 9.67 X10*3/UL (ref 1.2–7.7)
NEUTROPHILS NFR BLD AUTO: 80.6 %
NITRITE UR QL STRIP.AUTO: NEGATIVE
NRBC BLD-RTO: 0 /100 WBCS (ref 0–0)
PH UR STRIP.AUTO: 6 [PH]
PLATELET # BLD AUTO: 298 X10*3/UL (ref 150–450)
POTASSIUM SERPL-SCNC: 3.6 MMOL/L (ref 3.5–5.3)
PROT SERPL-MCNC: 7.5 G/DL (ref 6.4–8.2)
PROT UR STRIP.AUTO-MCNC: NEGATIVE MG/DL
RBC # BLD AUTO: 5.04 X10*6/UL (ref 4–5.2)
RBC # UR STRIP.AUTO: ABNORMAL /UL
RBC #/AREA URNS AUTO: >20 /HPF
SARS-COV-2 RNA RESP QL NAA+PROBE: NOT DETECTED
SODIUM SERPL-SCNC: 140 MMOL/L (ref 136–145)
SP GR UR STRIP.AUTO: 1.04
SQUAMOUS #/AREA URNS AUTO: ABNORMAL /HPF
UROBILINOGEN UR STRIP.AUTO-MCNC: <2 MG/DL
WBC # BLD AUTO: 12 X10*3/UL (ref 4.4–11.3)
WBC #/AREA URNS AUTO: ABNORMAL /HPF

## 2024-04-13 PROCEDURE — 2500000004 HC RX 250 GENERAL PHARMACY W/ HCPCS (ALT 636 FOR OP/ED)

## 2024-04-13 PROCEDURE — 2500000002 HC RX 250 W HCPCS SELF ADMINISTERED DRUGS (ALT 637 FOR MEDICARE OP, ALT 636 FOR OP/ED): Performed by: INTERNAL MEDICINE

## 2024-04-13 PROCEDURE — 2500000004 HC RX 250 GENERAL PHARMACY W/ HCPCS (ALT 636 FOR OP/ED): Performed by: INTERNAL MEDICINE

## 2024-04-13 PROCEDURE — 99285 EMERGENCY DEPT VISIT HI MDM: CPT | Mod: 25

## 2024-04-13 PROCEDURE — 93010 ELECTROCARDIOGRAM REPORT: CPT | Performed by: EMERGENCY MEDICINE

## 2024-04-13 PROCEDURE — 36415 COLL VENOUS BLD VENIPUNCTURE: CPT | Performed by: EMERGENCY MEDICINE

## 2024-04-13 PROCEDURE — G0378 HOSPITAL OBSERVATION PER HR: HCPCS

## 2024-04-13 PROCEDURE — 80053 COMPREHEN METABOLIC PANEL: CPT

## 2024-04-13 PROCEDURE — 71275 CT ANGIOGRAPHY CHEST: CPT | Performed by: STUDENT IN AN ORGANIZED HEALTH CARE EDUCATION/TRAINING PROGRAM

## 2024-04-13 PROCEDURE — 2550000001 HC RX 255 CONTRASTS: Performed by: EMERGENCY MEDICINE

## 2024-04-13 PROCEDURE — 96372 THER/PROPH/DIAG INJ SC/IM: CPT | Performed by: INTERNAL MEDICINE

## 2024-04-13 PROCEDURE — 99285 EMERGENCY DEPT VISIT HI MDM: CPT | Performed by: EMERGENCY MEDICINE

## 2024-04-13 PROCEDURE — 2500000001 HC RX 250 WO HCPCS SELF ADMINISTERED DRUGS (ALT 637 FOR MEDICARE OP): Performed by: INTERNAL MEDICINE

## 2024-04-13 PROCEDURE — 81001 URINALYSIS AUTO W/SCOPE: CPT

## 2024-04-13 PROCEDURE — 85025 COMPLETE CBC W/AUTO DIFF WBC: CPT

## 2024-04-13 PROCEDURE — 96374 THER/PROPH/DIAG INJ IV PUSH: CPT

## 2024-04-13 PROCEDURE — 87636 SARSCOV2 & INF A&B AMP PRB: CPT | Performed by: EMERGENCY MEDICINE

## 2024-04-13 PROCEDURE — 96375 TX/PRO/DX INJ NEW DRUG ADDON: CPT

## 2024-04-13 PROCEDURE — 36415 COLL VENOUS BLD VENIPUNCTURE: CPT

## 2024-04-13 PROCEDURE — 93005 ELECTROCARDIOGRAM TRACING: CPT

## 2024-04-13 PROCEDURE — 71275 CT ANGIOGRAPHY CHEST: CPT

## 2024-04-13 PROCEDURE — 2500000004 HC RX 250 GENERAL PHARMACY W/ HCPCS (ALT 636 FOR OP/ED): Performed by: EMERGENCY MEDICINE

## 2024-04-13 PROCEDURE — 74177 CT ABD & PELVIS W/CONTRAST: CPT

## 2024-04-13 PROCEDURE — 83690 ASSAY OF LIPASE: CPT

## 2024-04-13 PROCEDURE — 84484 ASSAY OF TROPONIN QUANT: CPT | Performed by: EMERGENCY MEDICINE

## 2024-04-13 PROCEDURE — 96361 HYDRATE IV INFUSION ADD-ON: CPT

## 2024-04-13 PROCEDURE — 74177 CT ABD & PELVIS W/CONTRAST: CPT | Performed by: RADIOLOGY

## 2024-04-13 PROCEDURE — 2500000005 HC RX 250 GENERAL PHARMACY W/O HCPCS

## 2024-04-13 PROCEDURE — 99223 1ST HOSP IP/OBS HIGH 75: CPT | Performed by: INTERNAL MEDICINE

## 2024-04-13 RX ORDER — KETOROLAC TROMETHAMINE 15 MG/ML
15 INJECTION, SOLUTION INTRAMUSCULAR; INTRAVENOUS ONCE
Status: COMPLETED | OUTPATIENT
Start: 2024-04-13 | End: 2024-04-13

## 2024-04-13 RX ORDER — OXYCODONE AND ACETAMINOPHEN 5; 325 MG/1; MG/1
1 TABLET ORAL EVERY 6 HOURS PRN
Qty: 12 TABLET | Refills: 0 | Status: SHIPPED | OUTPATIENT
Start: 2024-04-13 | End: 2024-04-25 | Stop reason: WASHOUT

## 2024-04-13 RX ORDER — POLYETHYLENE GLYCOL 3350 17 G/17G
17 POWDER, FOR SOLUTION ORAL DAILY
Status: DISCONTINUED | OUTPATIENT
Start: 2024-04-13 | End: 2024-04-14 | Stop reason: HOSPADM

## 2024-04-13 RX ORDER — ONDANSETRON 4 MG/1
4 TABLET, ORALLY DISINTEGRATING ORAL EVERY 8 HOURS PRN
Status: DISCONTINUED | OUTPATIENT
Start: 2024-04-13 | End: 2024-04-14 | Stop reason: HOSPADM

## 2024-04-13 RX ORDER — ONDANSETRON HYDROCHLORIDE 2 MG/ML
4 INJECTION, SOLUTION INTRAVENOUS EVERY 8 HOURS PRN
Status: DISCONTINUED | OUTPATIENT
Start: 2024-04-13 | End: 2024-04-14 | Stop reason: HOSPADM

## 2024-04-13 RX ORDER — ACETAMINOPHEN 325 MG/1
650 TABLET ORAL EVERY 4 HOURS PRN
Status: DISCONTINUED | OUTPATIENT
Start: 2024-04-13 | End: 2024-04-14 | Stop reason: HOSPADM

## 2024-04-13 RX ORDER — ENOXAPARIN SODIUM 100 MG/ML
60 INJECTION SUBCUTANEOUS EVERY 12 HOURS SCHEDULED
Status: DISCONTINUED | OUTPATIENT
Start: 2024-04-13 | End: 2024-04-14 | Stop reason: HOSPADM

## 2024-04-13 RX ORDER — GUAIFENESIN 600 MG/1
600 TABLET, EXTENDED RELEASE ORAL EVERY 12 HOURS PRN
Status: DISCONTINUED | OUTPATIENT
Start: 2024-04-13 | End: 2024-04-14 | Stop reason: HOSPADM

## 2024-04-13 RX ORDER — HYDRALAZINE HYDROCHLORIDE 20 MG/ML
5 INJECTION INTRAMUSCULAR; INTRAVENOUS ONCE
Status: COMPLETED | OUTPATIENT
Start: 2024-04-13 | End: 2024-04-13

## 2024-04-13 RX ORDER — ACETAMINOPHEN 650 MG/1
650 SUPPOSITORY RECTAL EVERY 4 HOURS PRN
Status: DISCONTINUED | OUTPATIENT
Start: 2024-04-13 | End: 2024-04-14 | Stop reason: HOSPADM

## 2024-04-13 RX ORDER — ONDANSETRON 4 MG/1
4 TABLET, ORALLY DISINTEGRATING ORAL EVERY 8 HOURS PRN
Qty: 15 TABLET | Refills: 0 | Status: SHIPPED | OUTPATIENT
Start: 2024-04-13 | End: 2024-04-25 | Stop reason: WASHOUT

## 2024-04-13 RX ORDER — TAMSULOSIN HYDROCHLORIDE 0.4 MG/1
0.4 CAPSULE ORAL DAILY
Qty: 30 CAPSULE | Refills: 0 | Status: SHIPPED | OUTPATIENT
Start: 2024-04-13 | End: 2024-05-13

## 2024-04-13 RX ORDER — PROMETHAZINE HYDROCHLORIDE 25 MG/1
25 SUPPOSITORY RECTAL EVERY 12 HOURS PRN
Status: DISCONTINUED | OUTPATIENT
Start: 2024-04-13 | End: 2024-04-14 | Stop reason: HOSPADM

## 2024-04-13 RX ORDER — NAPROXEN 500 MG/1
500 TABLET ORAL
Qty: 14 TABLET | Refills: 0 | Status: SHIPPED | OUTPATIENT
Start: 2024-04-13 | End: 2024-04-25 | Stop reason: WASHOUT

## 2024-04-13 RX ORDER — LOSARTAN POTASSIUM 100 MG/1
100 TABLET ORAL DAILY
Status: DISCONTINUED | OUTPATIENT
Start: 2024-04-13 | End: 2024-04-14 | Stop reason: HOSPADM

## 2024-04-13 RX ORDER — ACETAMINOPHEN 500 MG
5 TABLET ORAL NIGHTLY PRN
Status: DISCONTINUED | OUTPATIENT
Start: 2024-04-13 | End: 2024-04-14 | Stop reason: HOSPADM

## 2024-04-13 RX ORDER — MORPHINE SULFATE 4 MG/ML
4 INJECTION, SOLUTION INTRAMUSCULAR; INTRAVENOUS ONCE
Status: COMPLETED | OUTPATIENT
Start: 2024-04-13 | End: 2024-04-13

## 2024-04-13 RX ORDER — PROMETHAZINE HYDROCHLORIDE 25 MG/1
25 TABLET ORAL EVERY 6 HOURS PRN
Status: DISCONTINUED | OUTPATIENT
Start: 2024-04-13 | End: 2024-04-14 | Stop reason: HOSPADM

## 2024-04-13 RX ORDER — FUROSEMIDE 10 MG/ML
40 INJECTION INTRAMUSCULAR; INTRAVENOUS EVERY 12 HOURS
Status: DISCONTINUED | OUTPATIENT
Start: 2024-04-13 | End: 2024-04-14 | Stop reason: HOSPADM

## 2024-04-13 RX ORDER — ONDANSETRON HYDROCHLORIDE 2 MG/ML
4 INJECTION, SOLUTION INTRAVENOUS ONCE
Status: COMPLETED | OUTPATIENT
Start: 2024-04-13 | End: 2024-04-13

## 2024-04-13 RX ORDER — ACETAMINOPHEN 160 MG/5ML
650 SOLUTION ORAL EVERY 4 HOURS PRN
Status: DISCONTINUED | OUTPATIENT
Start: 2024-04-13 | End: 2024-04-14 | Stop reason: HOSPADM

## 2024-04-13 RX ORDER — TAMSULOSIN HYDROCHLORIDE 0.4 MG/1
0.4 CAPSULE ORAL DAILY
Status: DISCONTINUED | OUTPATIENT
Start: 2024-04-13 | End: 2024-04-14 | Stop reason: HOSPADM

## 2024-04-13 RX ADMIN — KETOROLAC TROMETHAMINE 15 MG: 15 INJECTION, SOLUTION INTRAMUSCULAR; INTRAVENOUS at 09:04

## 2024-04-13 RX ADMIN — ENOXAPARIN SODIUM 60 MG: 60 INJECTION SUBCUTANEOUS at 15:33

## 2024-04-13 RX ADMIN — FUROSEMIDE 40 MG: 10 INJECTION, SOLUTION INTRAMUSCULAR; INTRAVENOUS at 15:28

## 2024-04-13 RX ADMIN — SODIUM CHLORIDE 1000 ML: 9 INJECTION, SOLUTION INTRAVENOUS at 07:40

## 2024-04-13 RX ADMIN — IOHEXOL 90 ML: 350 INJECTION, SOLUTION INTRAVENOUS at 08:15

## 2024-04-13 RX ADMIN — Medication 5 MG: at 22:03

## 2024-04-13 RX ADMIN — IOHEXOL 60 ML: 350 INJECTION, SOLUTION INTRAVENOUS at 12:15

## 2024-04-13 RX ADMIN — ONDANSETRON 4 MG: 2 INJECTION INTRAMUSCULAR; INTRAVENOUS at 07:39

## 2024-04-13 RX ADMIN — TAMSULOSIN HYDROCHLORIDE 0.4 MG: 0.4 CAPSULE ORAL at 15:34

## 2024-04-13 RX ADMIN — MORPHINE SULFATE 4 MG: 4 INJECTION, SOLUTION INTRAMUSCULAR; INTRAVENOUS at 07:38

## 2024-04-13 RX ADMIN — ENOXAPARIN SODIUM 60 MG: 60 INJECTION SUBCUTANEOUS at 20:11

## 2024-04-13 RX ADMIN — HYDRALAZINE HYDROCHLORIDE 5 MG: 20 INJECTION INTRAMUSCULAR; INTRAVENOUS at 20:11

## 2024-04-13 RX ADMIN — LOSARTAN POTASSIUM 100 MG: 100 TABLET, FILM COATED ORAL at 15:34

## 2024-04-13 RX ADMIN — Medication 3 L/MIN: at 14:00

## 2024-04-13 SDOH — SOCIAL STABILITY: SOCIAL INSECURITY
WITHIN THE LAST YEAR, HAVE TO BEEN RAPED OR FORCED TO HAVE ANY KIND OF SEXUAL ACTIVITY BY YOUR PARTNER OR EX-PARTNER?: NO

## 2024-04-13 SDOH — HEALTH STABILITY: PHYSICAL HEALTH: ON AVERAGE, HOW MANY MINUTES DO YOU ENGAGE IN EXERCISE AT THIS LEVEL?: 20 MIN

## 2024-04-13 SDOH — SOCIAL STABILITY: SOCIAL NETWORK
DO YOU BELONG TO ANY CLUBS OR ORGANIZATIONS SUCH AS CHURCH GROUPS UNIONS, FRATERNAL OR ATHLETIC GROUPS, OR SCHOOL GROUPS?: NO

## 2024-04-13 SDOH — SOCIAL STABILITY: SOCIAL NETWORK: ARE YOU MARRIED, WIDOWED, DIVORCED, SEPARATED, NEVER MARRIED, OR LIVING WITH A PARTNER?: MARRIED

## 2024-04-13 SDOH — SOCIAL STABILITY: SOCIAL INSECURITY: ABUSE: ADULT

## 2024-04-13 SDOH — SOCIAL STABILITY: SOCIAL INSECURITY: WITHIN THE LAST YEAR, HAVE YOU BEEN HUMILIATED OR EMOTIONALLY ABUSED IN OTHER WAYS BY YOUR PARTNER OR EX-PARTNER?: NO

## 2024-04-13 SDOH — HEALTH STABILITY: MENTAL HEALTH
HOW OFTEN DO YOU NEED TO HAVE SOMEONE HELP YOU WHEN YOU READ INSTRUCTIONS, PAMPHLETS, OR OTHER WRITTEN MATERIAL FROM YOUR DOCTOR OR PHARMACY?: NEVER

## 2024-04-13 SDOH — SOCIAL STABILITY: SOCIAL NETWORK: HOW OFTEN DO YOU ATTENT MEETINGS OF THE CLUB OR ORGANIZATION YOU BELONG TO?: NEVER

## 2024-04-13 SDOH — ECONOMIC STABILITY: FOOD INSECURITY: WITHIN THE PAST 12 MONTHS, YOU WORRIED THAT YOUR FOOD WOULD RUN OUT BEFORE YOU GOT MONEY TO BUY MORE.: NEVER TRUE

## 2024-04-13 SDOH — SOCIAL STABILITY: SOCIAL INSECURITY: DO YOU FEEL UNSAFE GOING BACK TO THE PLACE WHERE YOU ARE LIVING?: NO

## 2024-04-13 SDOH — SOCIAL STABILITY: SOCIAL INSECURITY: HAS ANYONE EVER THREATENED TO HURT YOUR FAMILY OR YOUR PETS?: NO

## 2024-04-13 SDOH — HEALTH STABILITY: MENTAL HEALTH
STRESS IS WHEN SOMEONE FEELS TENSE, NERVOUS, ANXIOUS, OR CAN'T SLEEP AT NIGHT BECAUSE THEIR MIND IS TROUBLED. HOW STRESSED ARE YOU?: NOT AT ALL

## 2024-04-13 SDOH — SOCIAL STABILITY: SOCIAL INSECURITY: DOES ANYONE TRY TO KEEP YOU FROM HAVING/CONTACTING OTHER FRIENDS OR DOING THINGS OUTSIDE YOUR HOME?: NO

## 2024-04-13 SDOH — ECONOMIC STABILITY: INCOME INSECURITY: IN THE PAST 12 MONTHS, HAS THE ELECTRIC, GAS, OIL, OR WATER COMPANY THREATENED TO SHUT OFF SERVICE IN YOUR HOME?: NO

## 2024-04-13 SDOH — ECONOMIC STABILITY: FOOD INSECURITY: WITHIN THE PAST 12 MONTHS, THE FOOD YOU BOUGHT JUST DIDN'T LAST AND YOU DIDN'T HAVE MONEY TO GET MORE.: NEVER TRUE

## 2024-04-13 SDOH — SOCIAL STABILITY: SOCIAL INSECURITY
WITHIN THE LAST YEAR, HAVE YOU BEEN KICKED, HIT, SLAPPED, OR OTHERWISE PHYSICALLY HURT BY YOUR PARTNER OR EX-PARTNER?: NO

## 2024-04-13 SDOH — SOCIAL STABILITY: SOCIAL INSECURITY: WITHIN THE LAST YEAR, HAVE YOU BEEN AFRAID OF YOUR PARTNER OR EX-PARTNER?: NO

## 2024-04-13 SDOH — SOCIAL STABILITY: SOCIAL INSECURITY: WERE YOU ABLE TO COMPLETE ALL THE BEHAVIORAL HEALTH SCREENINGS?: YES

## 2024-04-13 SDOH — HEALTH STABILITY: PHYSICAL HEALTH: ON AVERAGE, HOW MANY DAYS PER WEEK DO YOU ENGAGE IN MODERATE TO STRENUOUS EXERCISE (LIKE A BRISK WALK)?: 2 DAYS

## 2024-04-13 SDOH — SOCIAL STABILITY: SOCIAL INSECURITY: DO YOU FEEL ANYONE HAS EXPLOITED OR TAKEN ADVANTAGE OF YOU FINANCIALLY OR OF YOUR PERSONAL PROPERTY?: NO

## 2024-04-13 SDOH — SOCIAL STABILITY: SOCIAL NETWORK: HOW OFTEN DO YOU GET TOGETHER WITH FRIENDS OR RELATIVES?: ONCE A WEEK

## 2024-04-13 SDOH — SOCIAL STABILITY: SOCIAL NETWORK
IN A TYPICAL WEEK, HOW MANY TIMES DO YOU TALK ON THE PHONE WITH FAMILY, FRIENDS, OR NEIGHBORS?: MORE THAN THREE TIMES A WEEK

## 2024-04-13 SDOH — SOCIAL STABILITY: SOCIAL INSECURITY: ARE THERE ANY APPARENT SIGNS OF INJURIES/BEHAVIORS THAT COULD BE RELATED TO ABUSE/NEGLECT?: NO

## 2024-04-13 SDOH — SOCIAL STABILITY: SOCIAL NETWORK: HOW OFTEN DO YOU ATTEND CHURCH OR RELIGIOUS SERVICES?: NEVER

## 2024-04-13 SDOH — SOCIAL STABILITY: SOCIAL INSECURITY: ARE YOU OR HAVE YOU BEEN THREATENED OR ABUSED PHYSICALLY, EMOTIONALLY, OR SEXUALLY BY ANYONE?: NO

## 2024-04-13 SDOH — SOCIAL STABILITY: SOCIAL INSECURITY: HAVE YOU HAD THOUGHTS OF HARMING ANYONE ELSE?: NO

## 2024-04-13 ASSESSMENT — LIFESTYLE VARIABLES
SKIP TO QUESTIONS 9-10: 1
AUDIT-C TOTAL SCORE: 0
SUBSTANCE_ABUSE_PAST_12_MONTHS: NO
HOW MANY STANDARD DRINKS CONTAINING ALCOHOL DO YOU HAVE ON A TYPICAL DAY: PATIENT DOES NOT DRINK
HOW OFTEN DO YOU HAVE A DRINK CONTAINING ALCOHOL: NEVER
PRESCIPTION_ABUSE_PAST_12_MONTHS: NO
AUDIT-C TOTAL SCORE: 0
HOW OFTEN DO YOU HAVE 6 OR MORE DRINKS ON ONE OCCASION: NEVER

## 2024-04-13 ASSESSMENT — COGNITIVE AND FUNCTIONAL STATUS - GENERAL
DAILY ACTIVITIY SCORE: 24
PATIENT BASELINE BEDBOUND: NO
MOBILITY SCORE: 24
DAILY ACTIVITIY SCORE: 24
MOBILITY SCORE: 24

## 2024-04-13 ASSESSMENT — PAIN - FUNCTIONAL ASSESSMENT
PAIN_FUNCTIONAL_ASSESSMENT: 0-10

## 2024-04-13 ASSESSMENT — ACTIVITIES OF DAILY LIVING (ADL)
JUDGMENT_ADEQUATE_SAFELY_COMPLETE_DAILY_ACTIVITIES: YES
LACK_OF_TRANSPORTATION: NO
TOILETING: INDEPENDENT
GROOMING: INDEPENDENT
FEEDING YOURSELF: INDEPENDENT
LACK_OF_TRANSPORTATION: NO
PATIENT'S MEMORY ADEQUATE TO SAFELY COMPLETE DAILY ACTIVITIES?: YES
ADEQUATE_TO_COMPLETE_ADL: YES
DRESSING YOURSELF: INDEPENDENT
HEARING - RIGHT EAR: FUNCTIONAL
BATHING: INDEPENDENT
WALKS IN HOME: INDEPENDENT
HEARING - LEFT EAR: FUNCTIONAL

## 2024-04-13 ASSESSMENT — COLUMBIA-SUICIDE SEVERITY RATING SCALE - C-SSRS
1. IN THE PAST MONTH, HAVE YOU WISHED YOU WERE DEAD OR WISHED YOU COULD GO TO SLEEP AND NOT WAKE UP?: NO
6. HAVE YOU EVER DONE ANYTHING, STARTED TO DO ANYTHING, OR PREPARED TO DO ANYTHING TO END YOUR LIFE?: NO
2. HAVE YOU ACTUALLY HAD ANY THOUGHTS OF KILLING YOURSELF?: NO

## 2024-04-13 ASSESSMENT — PAIN DESCRIPTION - PAIN TYPE
TYPE: ACUTE PAIN
TYPE: ACUTE PAIN

## 2024-04-13 ASSESSMENT — PAIN DESCRIPTION - ORIENTATION
ORIENTATION: LOWER
ORIENTATION: LEFT

## 2024-04-13 ASSESSMENT — PAIN SCALES - GENERAL
PAINLEVEL_OUTOF10: 0 - NO PAIN
PAINLEVEL_OUTOF10: 4
PAINLEVEL_OUTOF10: 9
PAINLEVEL_OUTOF10: 2
PAINLEVEL_OUTOF10: 0 - NO PAIN
PAINLEVEL_OUTOF10: 8

## 2024-04-13 ASSESSMENT — PATIENT HEALTH QUESTIONNAIRE - PHQ9
2. FEELING DOWN, DEPRESSED OR HOPELESS: NOT AT ALL
SUM OF ALL RESPONSES TO PHQ9 QUESTIONS 1 & 2: 0
1. LITTLE INTEREST OR PLEASURE IN DOING THINGS: NOT AT ALL

## 2024-04-13 ASSESSMENT — PAIN DESCRIPTION - LOCATION: LOCATION: BACK

## 2024-04-13 NOTE — ED NOTES
Patient unable to tolerate ambulation on RA at time of discharge; spo2 70% after ambulating to br; patient placed on 2L and Dr. Gregorio and Dr. Krishnamurthy notified.       Jessica Sullivan RN  04/13/24 0539

## 2024-04-13 NOTE — DISCHARGE INSTRUCTIONS
You were evaluated for flank pain.  We found a 5 mm stone in your left ureter.  You should take naproxen 500 mg twice daily for the next few days.  You may use Percocet every 6 hours as needed for severe breakthrough pain.  Anemia Zofran as needed for nausea and vomiting.  We have also prescribed Flomax to be used once daily.  If your pain becomes uncontrollable despite this, he develop intractable vomiting, fevers, or other worrisome symptoms, return to the ER.

## 2024-04-13 NOTE — ED PROVIDER NOTES
EMERGENCY DEPARTMENT ENCOUNTER      Pt Name: Libertad Rees  MRN: [id number]  Birthdate 1960  Date of evaluation: 4/13/2024  Provider: Marco Krishnamurthy DO    CHIEF COMPLAINT       Chief Complaint   Patient presents with    Flank Pain     Left sided flank pain.  States it feels like previous kidney stone         HISTORY OF PRESENT ILLNESS    HPI  Patient 64-year-old female with history of kidney stones, HTN, left adrenal adenoma presenting with left flank pain.  Onset 2 to 3 days ago and progressively worsened.  Pain is 8/10, sharp, radiating along the flank to the left groin, constant, without consistent alleviating or exacerbating factors.  She notes nausea without vomiting.  She denies fever, chills, chest pain, shortness of breath, change in bowel or bladder.    Nursing Notes were reviewed.    PAST MEDICAL HISTORY     Past Medical History:   Diagnosis Date    Adrenal adenoma     Adverse effect of anesthesia     low oxygen levels postop    Hypertension     Lipoma     Nephrolithiasis     Post-menopausal bleeding 12/27/2023         SURGICAL HISTORY       Past Surgical History:   Procedure Laterality Date    LIPOMA RESECTION      LITHOTRIPSY      OTHER SURGICAL HISTORY  12/22/2022    Thoracotomy    SHOULDER SURGERY Right          CURRENT MEDICATIONS       Discharge Medication List as of 4/14/2024 12:23 PM        CONTINUE these medications which have NOT CHANGED    Details   irbesartan (Avapro) 300 mg tablet TAKE 1 TABLET BY MOUTH EVERY DAY, Starting Tue 12/12/2023, Normal             ALLERGIES     Banana, Kiwi, and Latex    FAMILY HISTORY       Family History   Problem Relation Name Age of Onset    Hypertension Mother      Stroke Mother      Diabetes Brother      Hypertension Brother            SOCIAL HISTORY       Social History     Socioeconomic History    Marital status:      Spouse name: None    Number of children: None    Years of education: None    Highest education level: None   Occupational  History    None   Tobacco Use    Smoking status: Former     Types: Cigarettes    Smokeless tobacco: Never   Vaping Use    Vaping status: Never Used   Substance and Sexual Activity    Alcohol use: Not Currently    Drug use: Never    Sexual activity: None   Other Topics Concern    None   Social History Narrative    None     Social Determinants of Health     Financial Resource Strain: Low Risk  (4/13/2024)    Overall Financial Resource Strain (CARDIA)     Difficulty of Paying Living Expenses: Not very hard   Food Insecurity: No Food Insecurity (4/13/2024)    Hunger Vital Sign     Worried About Running Out of Food in the Last Year: Never true     Ran Out of Food in the Last Year: Never true   Transportation Needs: No Transportation Needs (4/13/2024)    PRAPARE - Transportation     Lack of Transportation (Medical): No     Lack of Transportation (Non-Medical): No   Physical Activity: Insufficiently Active (4/13/2024)    Exercise Vital Sign     Days of Exercise per Week: 2 days     Minutes of Exercise per Session: 20 min   Stress: No Stress Concern Present (4/13/2024)    [place] [place] [employer] - Occupational Stress Questionnaire     Feeling of Stress : Not at all   Social Connections: Moderately Isolated (4/13/2024)    Social Connection and Isolation Panel [NHANES]     Frequency of Communication with Friends and Family: More than three times a week     Frequency of Social Gatherings with Friends and Family: Once a week     Attends Ep[employer] Services: Never     Active Member of Clubs or Organizations: No     Attends Club or Organization Meetings: Never     Marital Status:    Intimate Partner Violence: Not At Risk (4/13/2024)    Humiliation, Afraid, Rape, and Kick questionnaire     Fear of Current or Ex-Partner: No     Emotionally Abused: No     Physically Abused: No     Sexually Abused: No   Housing Stability: Low Risk  (4/13/2024)    Housing Stability Vital Sign     Unable to Pay for Housing in the  Last Year: No     Number of Places Lived in the Last Year: 1     Unstable Housing in the Last Year: No       SCREENINGS                        PHYSICAL EXAM    (up to 7 for level 4, 8 or more for level 5)     ED Triage Vitals [04/13/24 0650]   Temperature Heart Rate Respirations BP   36.1 °C (97 °F) 92 20 138/88      Pulse Ox Temp Source Heart Rate Source Patient Position   94 % Temporal Monitor Sitting      BP Location FiO2 (%)     Right arm --       Physical Exam  Vitals and nursing note reviewed.   Constitutional:       Appearance: She is not toxic-appearing.      Comments: Appears uncomfortable   HENT:      Head: Normocephalic and atraumatic.      Nose: Nose normal.      Mouth/Throat:      Mouth: Mucous membranes are dry.      Pharynx: Oropharynx is clear.   Eyes:      Extraocular Movements: Extraocular movements intact.      Conjunctiva/sclera: Conjunctivae normal.   Cardiovascular:      Rate and Rhythm: Normal rate and regular rhythm.   Pulmonary:      Effort: Pulmonary effort is normal. No respiratory distress.      Breath sounds: Normal breath sounds.   Abdominal:      General: There is no distension.      Palpations: Abdomen is soft.      Tenderness: There is no abdominal tenderness. There is left CVA tenderness. There is no right CVA tenderness.   Musculoskeletal:         General: No deformity or signs of injury.      Cervical back: Normal range of motion and neck supple.   Skin:     General: Skin is warm and dry.   Neurological:      General: No focal deficit present.          DIAGNOSTIC RESULTS     LABS:  Labs Reviewed   CBC WITH AUTO DIFFERENTIAL - Abnormal       Result Value    WBC 12.0 (*)     nRBC 0.0      RBC 5.04      Hemoglobin 14.6      Hematocrit 45.4      MCV 90      MCH 29.0      MCHC 32.2      RDW 13.8      Platelets 298      Neutrophils % 80.6      Immature Granulocytes %, Automated 0.3      Lymphocytes % 12.8      Monocytes % 5.8      Eosinophils % 0.3      Basophils % 0.2      Neutrophils  Absolute 9.67 (*)     Immature Granulocytes Absolute, Automated 0.04      Lymphocytes Absolute 1.54      Monocytes Absolute 0.70      Eosinophils Absolute 0.04      Basophils Absolute 0.03     COMPREHENSIVE METABOLIC PANEL - Abnormal    Glucose 152 (*)     Sodium 140      Potassium 3.6      Chloride 100      Bicarbonate 31      Anion Gap 13      Urea Nitrogen 18      Creatinine 0.64      eGFR >90      Calcium 9.7      Albumin 3.9      Alkaline Phosphatase 95      Total Protein 7.5      AST 16      Bilirubin, Total 0.9      ALT 12     URINALYSIS WITH REFLEX CULTURE AND MICROSCOPIC - Abnormal    Color, Urine Yellow      Appearance, Urine Clear      Specific Gravity, Urine 1.041 (*)     pH, Urine 6.0      Protein, Urine NEGATIVE      Glucose, Urine NEGATIVE      Blood, Urine MODERATE (2+) (*)     Ketones, Urine NEGATIVE      Bilirubin, Urine NEGATIVE      Urobilinogen, Urine <2.0      Nitrite, Urine NEGATIVE      Leukocyte Esterase, Urine NEGATIVE     CBC - Abnormal    WBC 7.5      nRBC 0.0      RBC 4.70      Hemoglobin 13.5      Hematocrit 44.2      MCV 94      MCH 28.7      MCHC 30.5 (*)     RDW 13.9      Platelets 259     BASIC METABOLIC PANEL - Abnormal    Glucose 128 (*)     Sodium 141      Potassium 3.7      Chloride 98      Bicarbonate 36 (*)     Anion Gap 11      Urea Nitrogen 16      Creatinine 0.66      eGFR >90      Calcium 9.4     URINALYSIS MICROSCOPIC WITH REFLEX CULTURE - Abnormal    WBC, Urine 1-5      RBC, Urine >20 (*)     Squamous Epithelial Cells, Urine 1-9 (SPARSE)     LIPASE - Normal    Lipase 10      Narrative:     Venipuncture immediately after or during the administration of Metamizole may lead to falsely low results. Testing should be performed immediately prior to Metamizole dosing.   SERIAL TROPONIN-INITIAL - Normal    Troponin I, High Sensitivity 9      Narrative:     Less than 99th percentile of normal range cutoff-  Female and children under 18 years old <14 ng/L; Male <21 ng/L:  Negative  Repeat testing should be performed if clinically indicated.     Female and children under 18 years old 14-50 ng/L; Male 21-50 ng/L:  Consistent with possible cardiac damage and possible increased clinical   risk. Serial measurements may help to assess extent of myocardial damage.     >50 ng/L: Consistent with cardiac damage, increased clinical risk and  myocardial infarction. Serial measurements may help assess extent of   myocardial damage.      NOTE: Children less than 1 year old may have higher baseline troponin   levels and results should be interpreted in conjunction with the overall   clinical context.     NOTE: Troponin I testing is performed using a different   testing methodology at [facility] than at other   [facility]. Direct result comparisons should only   be made within the same method.   SERIAL TROPONIN, 1 HOUR - Normal    Troponin I, High Sensitivity 8      Narrative:     Less than 99th percentile of normal range cutoff-  Female and children under 18 years old <14 ng/L; Male <21 ng/L: Negative  Repeat testing should be performed if clinically indicated.     Female and children under 18 years old 14-50 ng/L; Male 21-50 ng/L:  Consistent with possible cardiac damage and possible increased clinical   risk. Serial measurements may help to assess extent of myocardial damage.     >50 ng/L: Consistent with cardiac damage, increased clinical risk and  myocardial infarction. Serial measurements may help assess extent of   myocardial damage.      NOTE: Children less than 1 year old may have higher baseline troponin   levels and results should be interpreted in conjunction with the overall   clinical context.     NOTE: Troponin I testing is performed using a different   testing methodology at [facility] than at other   [facility]. Direct result comparisons should only   be made within the same method.   SARS-COV-2 AND INFLUENZA A/B PCR - Normal    Flu A Result  Not Detected      Flu B Result Not Detected      Coronavirus 2019, PCR Not Detected      Narrative:     This assay has received FDA Emergency Use Authorization (EUA) and  is only authorized for the duration of time that circumstances exist to justify the authorization of the emergency use of in vitro diagnostic tests for the detection of SARS-CoV-2 virus and/or diagnosis of COVID-19 infection under section 564(b)(1) of the Act, 21 U.S.C. 360bbb-3(b)(1). Testing for SARS-CoV-2 is only recommended for patients who meet current clinical and/or epidemiological criteria as defined by federal, state, or local public health directives. This assay is an in vitro diagnostic nucleic acid amplification test for the qualitative detection of SARS-CoV-2, Influenza A, and Influenza B from nasopharyngeal specimens and has been validated for use at [facility]. Negative results do not preclude COVID-19 infections or Influenza A/B infections, and should not be used as the sole basis for diagnosis, treatment, or other management decisions. If Influenza A/B and RSV PCR results are negative, testing for Parainfluenza virus, Adenovirus and Metapneumovirus is routinely performed for [facility] pediatric oncology and intensive care inpatients, and is available on other patients by placing an add-on request.    URINALYSIS WITH REFLEX CULTURE AND MICROSCOPIC    Narrative:     The following orders were created for panel order Urinalysis with Reflex Culture and Microscopic.  Procedure                               Abnormality         Status                     ---------                               -----------         ------                     Urinalysis with Reflex C...[[id number]]  Abnormal            Final result               Extra Urine Gray Tube[[id number]]                            Final result                 Please view results for these tests on the individual orders.   EXTRA URINE GRAY TUBE    Extra Tube Hold for  add-ons.     TROPONIN SERIES- (INITIAL, 1 HR)    Narrative:     The following orders were created for panel order Troponin Series, (0, 1 HR).  Procedure                               Abnormality         Status                     ---------                               -----------         ------                     Troponin I, High Sensiti...[[id number]]  Normal              Final result               Troponin, High Sensitivi...[[id number]]  Normal              Final result                 Please view results for these tests on the individual orders.       All other labs were within normal range or not returned as of this dictation.    Imaging  Transthoracic Echo (TTE) Complete         CT angio chest for pulmonary embolism   Final Result   1. No evidence of acute central/major pulmonary embolism up to the   level of the 1st order segmental branches. The subsegmental and   peripheral branches are not well opacified and difficult to evaluate.   2. Scattered bilateral atelectatic bands.. No major pulmonary   consolidation, pleural effusion or pneumothorax.   3. Cardiomegaly.   4. Dilated right main pulmonary artery measuring 3.1 cm which could   be related to underlying pulmonary hypertension.   5. Heterogenous thyroid gland with limited evaluation given overlying   streak artifact from the IV contrast. This could be further assessed   by dedicated nonemergent thyroid ultrasound if clinically desired.                  MACRO:   None        Signed by: Henri Medina 4/13/2024 1:05 PM   Dictation workstation:   [id number]      CT abdomen pelvis w IV contrast   Final Result   1. 5 mm obstructing calculus in the upper left ureter with   mild-to-moderate left hydronephrosis   2. Right renal cortical hypodensity most likely represents a cyst   3. Dominant low-density 2.9 cm left adrenal nodule with additional   smaller bilateral adrenal nodules. These are nonspecific but could   represent adrenal adenomas. Correlation to  previous outside exams and   follow-up recommended to document stability             Signed by: Maxine Whitman 4/13/2024 8:48 AM   Dictation workstation:   [id number]           Procedures  Procedures     EMERGENCY DEPARTMENT COURSE/MDM:     Diagnoses as of 04/14/24 2122   Ureteral stone   Nausea   Hypoxia   Pulmonary hypertension (Multi)   Cardiomegaly        Medical Decision Making  History obtained for the patient.  Records including labs, imaging, notes reviewed.  Presentation concerning for possible kidney stone, UTI.  Basic labs are sent.  CBC notable for leukocytosis of 12, CMP with hyperglycemia of 152.  No profound electrolyte abnormalities.  Lipase within normal limits.  Urinalysis with blood only.  Patient given a liter of fluids and Zofran with resolution of nausea.  She was given 4 of morphine with no improvement in her pain.  CT demonstrated an obstructing 5 mm left proximal ureteral stone.  Patient was given Toradol with vast improvement in her pain.  She was subsequently discharged home in satisfactory condition with prescriptions for pain control and Zofran as well as Flomax.  Patient referred to urology as her previous urologist has likely retired.  All questions answered and return precautions discussed.    ADDENDUM:  Nursing staff alerted the resident and the attending physician after discharge that patient had become acutely hypoxic requiring 2 L nasal cannula to maintain saturations.  She reportedly dipped as low as 78% going to the bathroom with good waveform.  We initially thought this may be due to the morphine.  However, after retesting this several times over the course of the following hours, she was persistently hypoxic on room air.  Upon further probing, patient states that she has become progressively more out of breath especially with exertion.  She is been unable to go even half a flight of stairs without becoming profoundly short of breath.  Patient's discharge was canceled and a  cardiac workup was initiated.  Troponin series within normal limits.  EKG without evidence of acute injury pattern but with notable peaked T waves in leads I, 2, V6.  CT of the chest was obtained which was negative for pulmonary embolism or acute pulmonary pathology.  However, there is noted cardiomegaly with a dilated right main pulmonary artery highly concerning for pulmonary hypertension.  Patient is already on irbesartan.  However, given the worsening shortness of breath and new oxygen requirement, patient was subsequently admitted to the medicine service for further evaluation and treatment.  Patient was amenable to this plan    EKG demonstrated normal sinus rhythm at a rate of 90, QTc of 413.  Notable peaked T waves in 1, 2, V6.    Patient and or family in agreement and understanding of treatment plan.  All questions answered.      I reviewed the case with the attending ED physician. The attending ED physician agrees with the plan. Patient and/or patient´s representative was counseled regarding labs, imaging, likely diagnosis, and plan. All questions were answered.    ED Medications administered this visit:    Medications   ondansetron (Zofran) injection 4 mg (4 mg intravenous Given 4/13/24 0739)   morphine injection 4 mg (4 mg intravenous Given 4/13/24 0738)   sodium chloride 0.9 % bolus 1,000 mL (0 mL intravenous Stopped 4/13/24 0840)   iohexol (OMNIPaque) 350 mg iodine/mL solution 90 mL (90 mL intravenous Given 4/13/24 0815)   ketorolac (Toradol) injection 15 mg (15 mg intravenous Given 4/13/24 0904)   iohexol (OMNIPaque) 350 mg iodine/mL solution 60 mL (60 mL intravenous Given 4/13/24 1215)   hydrALAZINE (Apresoline) injection 5 mg (5 mg intravenous Given 4/13/24 2011)       New Prescriptions from this visit:    Discharge Medication List as of 4/14/2024 12:23 PM        START taking these medications    Details   acetaminophen (Tylenol) 325 mg tablet Take 2 tablets (650 mg) by mouth every 8 hours if needed  for mild pain (1 - 3), moderate pain (4 - 6), headaches or fever (temp greater than 38.0 C)., Starting Sun 4/14/2024, Normal      naproxen (Naprosyn) 500 mg tablet Take 1 tablet (500 mg) by mouth 2 times a day with meals for 7 days., Starting Sat 4/13/2024, Until Sat 4/20/2024, Normal      ondansetron ODT (Zofran-ODT) 4 mg disintegrating tablet Take 1 tablet (4 mg) by mouth every 8 hours if needed for nausea or vomiting for up to 15 doses., Starting Sat 4/13/2024, Normal      oxyCODONE-acetaminophen (Percocet) 5-325 mg tablet Take 1 tablet by mouth every 6 hours if needed for severe pain (7 - 10) for up to 12 doses., Starting Sat 4/13/2024, Normal      spironolactone (Aldactone) 25 mg tablet Take 1 tablet (25 mg) by mouth once daily., Starting Sun 4/14/2024, Until Tue 5/14/2024, Normal      tamsulosin (Flomax) 0.4 mg 24 hr capsule Take 1 capsule (0.4 mg) by mouth once daily for 30 doses., Starting Sat 4/13/2024, Until Mon 5/13/2024, Normal             Follow-up:  Rahul Dawn MD  [address]  [facility] [id number] [phone]    Schedule an appointment as soon as possible for a visit in 1 week      Primary care provider (PCP)          Stephenie Levine DO  [address]  [address]  [facility] [address]  [phone]              Final Impression:   1. Ureteral stone    2. Nausea    3. Hypoxia    4. Pulmonary hypertension (Multi)    5. Cardiomegaly    6. JUVENCIO (obstructive sleep apnea)    7. Hypertension, essential, benign    8. Hypoxemia    9. Acute respiratory failure with hypoxemia (Multi)          (Please note that portions of this note were completed with a voice recognition program.  Efforts were made to edit the dictations but occasionally words are mis-transcribed.)     Glen Gregorio MD  Resident  04/13/24 6426       Glen Gregorio MD  Resident  04/13/24 2946      The patient was seen by the resident/fellow.  I have personally performed a substantive portion of the encounter.  I  have seen and examined the patient; agree with the workup, evaluation, MDM, management and diagnosis.  The care plan has been discussed with the resident; I have reviewed the resident’s note and agree with the documented findings.       Marco Krishnamurthy,   04/14/24 1297

## 2024-04-13 NOTE — H&P
History Of Present Illness  Libertad Rees is a 64 y.o. female with past medical history of morbid obesity, abnormal uterine bleeding, hypertension, untreated sleep apnea, history of kidney stones requiring lithotripsy presented to emergency department for flank pain.  Patient has been dealing with abnormal uterine bleeding and getting blood work and testing done and later noticed flank pain.  Patient thought it would get better but lasted for 5  hours prompting for an ER visit.  Here in the emergency department patient was found to have kidney stone and her symptoms improved after receiving Toradol and morphine and they were about to discharge her.  Started having hypoxemia and was maintaining saturations low.  They did multiple ambulatory pulse ox study and she was placed on supplemental O2 and referred to hospitalist service.  CTA was performed which showed pulmonary hypertension and cardiomegaly.  Patient does report that she snores at night and has daytime fatigue and falls asleep easily.     Past Medical History  She has a past medical history of Adrenal adenoma, Adverse effect of anesthesia, Hypertension, Lipoma, Nephrolithiasis, and Post-menopausal bleeding (12/27/2023).    Surgical History  She has a past surgical history that includes Other surgical history (12/22/2022); Lithotripsy; Shoulder surgery (Right); and Lipoma resection.     Social History  She reports that she has quit smoking. Her smoking use included cigarettes. She has never used smokeless tobacco. She reports that she does not currently use alcohol. She reports that she does not use drugs.    Family History  Family History   Problem Relation Name Age of Onset    Hypertension Mother      Stroke Mother      Diabetes Brother      Hypertension Brother          Allergies  Banana, Kiwi, and Latex    Scheduled medications  furosemide, 40 mg, intravenous, q12h  oxygen, , inhalation, Continuous - Inhalation      Continuous medications     PRN  medications        Physical Exam:  General: Morbidly obese not in acute distress on 2 L nasal cannula  HEENT: PERRLA, head intact and normocephalic  Neck: Normal to inspection  Lungs: Clear to auscultation, work of breathing within normal limit  Cardiac: Regular rate and rhythm  Abdomen: Soft nontender, positive bowel sounds  : Exam deferred  Skin: Intact  Hematology: No petechia or excessive ecchymosis  Musculoskeletal: Without significant trauma.  Slight puffiness of lower extremities  Neurological: Alert awake oriented, no focal deficit, cranial nerves grossly intact  Psych: No suicidal ideation or homicidal ideation     Last Recorded Vitals  /75 (BP Location: Right arm, Patient Position: Sitting)   Pulse 84   Temp 36.1 °C (97 °F) (Temporal)   Resp 18   Wt 139 kg (307 lb 1.6 oz)   SpO2 97%     Relevant Results  CT angio chest for pulmonary embolism    Result Date: 4/13/2024  Interpreted By:  Henri Medina, STUDY: CT ANGIO CHEST FOR PULMONARY EMBOLISM;  4/13/2024 12:21 pm   INDICATION: Signs/Symptoms:Hypoxia with ambulation.   COMPARISON: None   ACCESSION NUMBER(S): [id number]   ORDERING CLINICIAN: PATRICE PAEZ   TECHNIQUE: Helical data acquisition of the chest was obtained after intravenous administration of 60 mL Omnipaque as per PE protocol. Images were reformatted in coronal and sagittal planes. Axial and coronal maximum intensity projection (MIP) images were created and reviewed.   FINDINGS: POTENTIAL LIMITATIONS OF THE STUDY: Suboptimal   HEART AND VESSELS: The pulmonary trunk and main pulmonary arteries are well opacify with no definite filling defects. The segmental and subsegmental branches are poorly opacified and difficult to evaluate.   Dilated right main pulmonary artery measuring 3.1 cm. No coronary artery calcifications are seen. Please note, the study is not optimized for evaluation of coronary arteries.   Heart: Cardiomegaly. No pericardial effusion.   MEDIASTINUM AND STEPHY,  LOWER NECK AND AXILLA: The visualized thyroid gland is within normal limits. No evidence of thoracic lymphadenopathy by CT criteria. Esophagus appears within normal limits as seen.   LUNGS AND AIRWAYS: Scattered bilateral atelectatic bands. No major pulmonary consolidation, pleural effusion or pneumothorax. Central airways are patent.   UPPER ABDOMEN: Left adrenal nodule measuring 2.6 cm with Hounsfield unit consistent with adenoma.   CHEST WALL AND OSSEOUS STRUCTURES: Chest wall is within normal limits. No acute osseous pathology.       1. No evidence of acute central/major pulmonary embolism up to the level of the 1st order segmental branches. The subsegmental and peripheral branches are not well opacified and difficult to evaluate. 2. Scattered bilateral atelectatic bands.. No major pulmonary consolidation, pleural effusion or pneumothorax. 3. Cardiomegaly. 4. Dilated right main pulmonary artery measuring 3.1 cm which could be related to underlying pulmonary hypertension. 5. Heterogenous thyroid gland with limited evaluation given overlying streak artifact from the IV contrast. This could be further assessed by dedicated nonemergent thyroid ultrasound if clinically desired.       MACRO: None   Signed by: Henri Medina 4/13/2024 1:05 PM Dictation workstation:   [id number]    CT abdomen pelvis w IV contrast    Result Date: 4/13/2024  Interpreted By:  Maxine Whitman, STUDY: CT ABDOMEN PELVIS W IV CONTRAST; 4/13/2024 8:22 am   INDICATION: Signs/Symptoms:left flank pain radiating to groin, similar to prior kidney stones, but also has known left adrenal mass.   COMPARISON: None..   ACCESSION NUMBER(S): [id number]   ORDERING CLINICIAN: OSNI LIN   TECHNIQUE: Oral contrast was not administered. 75 ml Omnipaque 350 was injected intravenously. CT of the Abdomen and Pelvis with intravenous contrast was performed. Axial, sagittal and coronal reformatted images were reviewed.   FINDINGS: Lower Chest: There are linear bands  of atelectasis in both lung bases. Elevation of the right diaphragm noted.   Abdomen: There is a delayed left nephrogram with mild-to-moderate left hydronephrosis due to a 5 mm obstructing calculus in the upper left ureter. Left perinephric infiltrative changes are noted. In the upper pole of the right kidney there is a 1.4 cm cortical hypodensity likely a cyst.   Within the left adrenal gland there is a low-density 2.9 by 2.9 by 2.9 cm mass with Hounsfield measurements of approximately 4. Medial to this low-density left adrenal nodule there is thickening of the left adrenal limbs. Smaller nodularities are seen in both adrenal glands with a 12 mm left adrenal nodule noted and a 11 mm right adrenal nodule noted.   The liver, biliary tree, gallbladder, spleen, and pancreas are unremarkable.   No bowel obstruction, free fluid or free air. Small fat containing umbilical hernia.   Pelvis: Appendix appears normal. Urinary bladder is decompressed. Uterus unremarkable. No adenopathy or free fluid.       1. 5 mm obstructing calculus in the upper left ureter with mild-to-moderate left hydronephrosis 2. Right renal cortical hypodensity most likely represents a cyst 3. Dominant low-density 2.9 cm left adrenal nodule with additional smaller bilateral adrenal nodules. These are nonspecific but could represent adrenal adenomas. Correlation to previous outside exams and follow-up recommended to document stability     Signed by: Maxine Whitman 4/13/2024 8:48 AM Dictation workstation:   [id number]    ECG 12 lead (Clinic Performed)    Result Date: 3/29/2024  Normal sinus rhythm.     US PELVIS TRANSABDOMINAL WITH TRANSVAGINAL    Result Date: 3/20/2024  Interpreted By:  Walter Perez, STUDY: US PELVIS TRANSABDOMINAL WITH TRANSVAGINAL; ;  3/19/2024 2:20 pm   INDICATION: Signs/Symptoms:pmb.   COMPARISON: None.   ACCESSION NUMBER(S): [id number]   ORDERING CLINICIAN: ROLF GIBSON   TECHNIQUE: Multiple transabdominal and  endovaginal sonographic images of the pelvis were performed.   FINDINGS: The uterus measures 9.9 x 4.5 cm in length in AP diameter. The transverse diameter 6.2 cm. The double wall endometrial thickness is 6 mm. There is no fluid distention of the endometrial cavity. Small nabothian cysts are identified in the cervix.   There is no sign of free pelvic fluid.   The ovaries are not visualized on this examination and part related to the patient's body habitus. There is no definite soft tissue mass or fluid collection visualized in the pelvis.       This is an unremarkable the limited pelvic ultrasound as described above.     MACRO: None   Signed by: Walter Perez 3/20/2024 6:44 PM Dictation workstation:   [id number]     Results for orders placed or performed during the hospital encounter of 04/13/24 (from the past 24 hour(s))   CBC and Auto Differential   Result Value Ref Range    WBC 12.0 (H) 4.4 - 11.3 x10*3/uL    nRBC 0.0 0.0 - 0.0 /100 WBCs    RBC 5.04 4.00 - 5.20 x10*6/uL    Hemoglobin 14.6 12.0 - 16.0 g/dL    Hematocrit 45.4 36.0 - 46.0 %    MCV 90 80 - 100 fL    MCH 29.0 26.0 - 34.0 pg    MCHC 32.2 32.0 - 36.0 g/dL    RDW 13.8 11.5 - 14.5 %    Platelets 298 150 - 450 x10*3/uL    Neutrophils % 80.6 40.0 - 80.0 %    Immature Granulocytes %, Automated 0.3 0.0 - 0.9 %    Lymphocytes % 12.8 13.0 - 44.0 %    Monocytes % 5.8 2.0 - 10.0 %    Eosinophils % 0.3 0.0 - 6.0 %    Basophils % 0.2 0.0 - 2.0 %    Neutrophils Absolute 9.67 (H) 1.20 - 7.70 x10*3/uL    Immature Granulocytes Absolute, Automated 0.04 0.00 - 0.70 x10*3/uL    Lymphocytes Absolute 1.54 1.20 - 4.80 x10*3/uL    Monocytes Absolute 0.70 0.10 - 1.00 x10*3/uL    Eosinophils Absolute 0.04 0.00 - 0.70 x10*3/uL    Basophils Absolute 0.03 0.00 - 0.10 x10*3/uL   Comprehensive metabolic panel   Result Value Ref Range    Glucose 152 (H) 74 - 99 mg/dL    Sodium 140 136 - 145 mmol/L    Potassium 3.6 3.5 - 5.3 mmol/L    Chloride 100 98 - 107 mmol/L     Bicarbonate 31 21 - 32 mmol/L    Anion Gap 13 10 - 20 mmol/L    Urea Nitrogen 18 6 - 23 mg/dL    Creatinine 0.64 0.50 - 1.05 mg/dL    eGFR >90 >60 mL/min/1.73m*2    Calcium 9.7 8.6 - 10.3 mg/dL    Albumin 3.9 3.4 - 5.0 g/dL    Alkaline Phosphatase 95 33 - 136 U/L    Total Protein 7.5 6.4 - 8.2 g/dL    AST 16 9 - 39 U/L    Bilirubin, Total 0.9 0.0 - 1.2 mg/dL    ALT 12 7 - 45 U/L   Lipase   Result Value Ref Range    Lipase 10 9 - 82 U/L   Urinalysis with Reflex Culture and Microscopic   Result Value Ref Range    Color, Urine Yellow Straw, Yellow    Appearance, Urine Clear Clear    Specific Gravity, Urine 1.041 (N) 1.005 - 1.035    pH, Urine 6.0 5.0, 5.5, 6.0, 6.5, 7.0, 7.5, 8.0    Protein, Urine NEGATIVE NEGATIVE mg/dL    Glucose, Urine NEGATIVE NEGATIVE mg/dL    Blood, Urine MODERATE (2+) (A) NEGATIVE    Ketones, Urine NEGATIVE NEGATIVE mg/dL    Bilirubin, Urine NEGATIVE NEGATIVE    Urobilinogen, Urine <2.0 <2.0 mg/dL    Nitrite, Urine NEGATIVE NEGATIVE    Leukocyte Esterase, Urine NEGATIVE NEGATIVE   Urinalysis Microscopic   Result Value Ref Range    WBC, Urine 1-5 1-5, NONE /HPF    RBC, Urine >20 (A) NONE, 1-2, 3-5 /HPF    Squamous Epithelial Cells, Urine 1-9 (SPARSE) Reference range not established. /HPF   Troponin I, High Sensitivity, Initial   Result Value Ref Range    Troponin I, High Sensitivity 9 0 - 13 ng/L        Assessment/Plan   Libertad Rees is a 64 y.o. female on day 0 of admission presenting with Hypoxemia.  Principal Problem:    Hypoxemia  Active Problems:    Postmenopausal bleeding    Hypertension, essential, benign    Adrenal adenoma    JUVENCIO (obstructive sleep apnea)    Morbid obesity (Multi)    Kidney stone    Acute respiratory failure with hypoxemia (Multi)      Libertad Rees is a 64 y.o. female with past medical history of morbid obesity, abnormal uterine bleeding, hypertension, untreated sleep apnea, history of kidney stones requiring lithotripsy presented to emergency department for flank  pain.  Patient was found to have 5 mm obstructive stone at the left upper ureter with mild to moderate hydronephrosis and pain control with medication.  She was going to be discharged but was hypoxic on multiple ambulatory pulse ox study likely from untreated sleep apnea.    Hypoxemia with pulmonary hypertension and cardiomegaly  Believe related to be untreated sleep apnea  Patient has snoring problems throughout the night  She has easy daytime fatigue and morbid obesity  Will do an echocardiogram with pulmonary hypertension  Lasix 40 mg twice a day  Telemetry  Cardiology consult  Patient is chest pain-free currently    Hypertension  Convert ARB to formulary losartan here  Hold hydrochlorothiazide  Do IV Lasix    Left-sided 5 mm obstructive ureteral stone  Pain control  Strain all urine  Patient getting Lasix  Should continue with Flomax while here  We will hold off on any antibiotics at this point they do not believe the patient is UTI    Morbid obesity  Lifestyle modification    Abnormal uterine bleeding  Getting workup as outpatient  No bleeding for last 14 days    DVT prophylaxis  Weight-based Lovenox    Plan discussed with patient and son at bedside in ED bed 20  Full code as per patient  High level of MDM based on above issue and discussing plan    This note is created using voice recognition software. All efforts are made to minimize errors, if there are errors there due to transcription.    Juan Kaur  Hospitalist

## 2024-04-14 ENCOUNTER — APPOINTMENT (OUTPATIENT)
Dept: CARDIOLOGY | Facility: HOSPITAL | Age: 64
End: 2024-04-14
Payer: COMMERCIAL

## 2024-04-14 VITALS
BODY MASS INDEX: 50.02 KG/M2 | TEMPERATURE: 97.9 F | HEIGHT: 64 IN | DIASTOLIC BLOOD PRESSURE: 77 MMHG | HEART RATE: 85 BPM | WEIGHT: 293 LBS | OXYGEN SATURATION: 96 % | RESPIRATION RATE: 18 BRPM | SYSTOLIC BLOOD PRESSURE: 153 MMHG

## 2024-04-14 LAB
ANION GAP SERPL CALC-SCNC: 11 MMOL/L (ref 10–20)
ATRIAL RATE: 90 BPM
BUN SERPL-MCNC: 16 MG/DL (ref 6–23)
CALCIUM SERPL-MCNC: 9.4 MG/DL (ref 8.6–10.3)
CHLORIDE SERPL-SCNC: 98 MMOL/L (ref 98–107)
CO2 SERPL-SCNC: 36 MMOL/L (ref 21–32)
CREAT SERPL-MCNC: 0.66 MG/DL (ref 0.5–1.05)
EGFRCR SERPLBLD CKD-EPI 2021: >90 ML/MIN/1.73M*2
ERYTHROCYTE [DISTWIDTH] IN BLOOD BY AUTOMATED COUNT: 13.9 % (ref 11.5–14.5)
GLUCOSE SERPL-MCNC: 128 MG/DL (ref 74–99)
HCT VFR BLD AUTO: 44.2 % (ref 36–46)
HGB BLD-MCNC: 13.5 G/DL (ref 12–16)
MCH RBC QN AUTO: 28.7 PG (ref 26–34)
MCHC RBC AUTO-ENTMCNC: 30.5 G/DL (ref 32–36)
MCV RBC AUTO: 94 FL (ref 80–100)
NRBC BLD-RTO: 0 /100 WBCS (ref 0–0)
P AXIS: 39 DEGREES
P OFFSET: 202 MS
P ONSET: 122 MS
PLATELET # BLD AUTO: 259 X10*3/UL (ref 150–450)
POTASSIUM SERPL-SCNC: 3.7 MMOL/L (ref 3.5–5.3)
PR INTERVAL: 194 MS
Q ONSET: 219 MS
QRS COUNT: 14 BEATS
QRS DURATION: 80 MS
QT INTERVAL: 338 MS
QTC CALCULATION(BAZETT): 413 MS
QTC FREDERICIA: 387 MS
R AXIS: 78 DEGREES
RBC # BLD AUTO: 4.7 X10*6/UL (ref 4–5.2)
SODIUM SERPL-SCNC: 141 MMOL/L (ref 136–145)
T AXIS: 29 DEGREES
T OFFSET: 388 MS
VENTRICULAR RATE: 90 BPM
WBC # BLD AUTO: 7.5 X10*3/UL (ref 4.4–11.3)

## 2024-04-14 PROCEDURE — 93306 TTE W/DOPPLER COMPLETE: CPT | Performed by: INTERNAL MEDICINE

## 2024-04-14 PROCEDURE — 96372 THER/PROPH/DIAG INJ SC/IM: CPT | Performed by: INTERNAL MEDICINE

## 2024-04-14 PROCEDURE — 2500000004 HC RX 250 GENERAL PHARMACY W/ HCPCS (ALT 636 FOR OP/ED): Performed by: INTERNAL MEDICINE

## 2024-04-14 PROCEDURE — 96376 TX/PRO/DX INJ SAME DRUG ADON: CPT

## 2024-04-14 PROCEDURE — 85027 COMPLETE CBC AUTOMATED: CPT | Performed by: INTERNAL MEDICINE

## 2024-04-14 PROCEDURE — 99223 1ST HOSP IP/OBS HIGH 75: CPT | Performed by: INTERNAL MEDICINE

## 2024-04-14 PROCEDURE — 2500000001 HC RX 250 WO HCPCS SELF ADMINISTERED DRUGS (ALT 637 FOR MEDICARE OP): Performed by: INTERNAL MEDICINE

## 2024-04-14 PROCEDURE — 2500000005 HC RX 250 GENERAL PHARMACY W/O HCPCS

## 2024-04-14 PROCEDURE — 2500000002 HC RX 250 W HCPCS SELF ADMINISTERED DRUGS (ALT 637 FOR MEDICARE OP, ALT 636 FOR OP/ED): Performed by: INTERNAL MEDICINE

## 2024-04-14 PROCEDURE — G0378 HOSPITAL OBSERVATION PER HR: HCPCS

## 2024-04-14 PROCEDURE — 93306 TTE W/DOPPLER COMPLETE: CPT

## 2024-04-14 PROCEDURE — 80048 BASIC METABOLIC PNL TOTAL CA: CPT | Performed by: INTERNAL MEDICINE

## 2024-04-14 PROCEDURE — 36415 COLL VENOUS BLD VENIPUNCTURE: CPT | Performed by: INTERNAL MEDICINE

## 2024-04-14 PROCEDURE — 99239 HOSP IP/OBS DSCHRG MGMT >30: CPT | Performed by: INTERNAL MEDICINE

## 2024-04-14 RX ORDER — SPIRONOLACTONE 25 MG/1
25 TABLET ORAL DAILY
Qty: 30 TABLET | Refills: 0 | Status: SHIPPED | OUTPATIENT
Start: 2024-04-14 | End: 2024-05-10 | Stop reason: SDUPTHER

## 2024-04-14 RX ORDER — ACETAMINOPHEN 325 MG/1
650 TABLET ORAL EVERY 8 HOURS PRN
Qty: 30 TABLET | Refills: 0 | Status: SHIPPED | OUTPATIENT
Start: 2024-04-14 | End: 2024-04-25 | Stop reason: ALTCHOICE

## 2024-04-14 RX ADMIN — TAMSULOSIN HYDROCHLORIDE 0.4 MG: 0.4 CAPSULE ORAL at 08:42

## 2024-04-14 RX ADMIN — FUROSEMIDE 40 MG: 10 INJECTION, SOLUTION INTRAMUSCULAR; INTRAVENOUS at 02:52

## 2024-04-14 RX ADMIN — Medication 2 L/MIN: at 08:00

## 2024-04-14 RX ADMIN — LOSARTAN POTASSIUM 100 MG: 100 TABLET, FILM COATED ORAL at 08:42

## 2024-04-14 RX ADMIN — ENOXAPARIN SODIUM 60 MG: 60 INJECTION SUBCUTANEOUS at 08:42

## 2024-04-14 ASSESSMENT — PAIN - FUNCTIONAL ASSESSMENT: PAIN_FUNCTIONAL_ASSESSMENT: 0-10

## 2024-04-14 ASSESSMENT — COGNITIVE AND FUNCTIONAL STATUS - GENERAL: DAILY ACTIVITIY SCORE: 24

## 2024-04-14 ASSESSMENT — PAIN SCALES - GENERAL
PAINLEVEL_OUTOF10: 0 - NO PAIN
PAINLEVEL_OUTOF10: 0 - NO PAIN

## 2024-04-14 ASSESSMENT — PAIN SCALES - WONG BAKER: WONGBAKER_NUMERICALRESPONSE: NO HURT

## 2024-04-14 NOTE — NURSING NOTE
Discharge orders gone over with patient. Verbal understanding given of orders and follow ups. Patient vital signs are stable at this time. Patient has no SOB or pain reported at this time. DC home with spouse.

## 2024-04-14 NOTE — HOSPITAL COURSE
Libertad Rees is a 64 y.o. female with past medical history of morbid obesity, abnormal uterine bleeding, hypertension, untreated sleep apnea, history of kidney stones requiring lithotripsy presented to emergency department for flank pain.  Patient has been dealing with abnormal uterine bleeding and getting blood work and testing done and later noticed flank pain.  Patient thought it would get better but lasted for 5  hours prompting for an ER visit.  Here in the emergency department patient was found to have kidney stone and her symptoms improved after receiving Toradol and morphine and they were about to discharge her.  Started having hypoxemia and was maintaining saturations low.  They did multiple ambulatory pulse ox study and she was placed on supplemental O2 and referred to hospitalist service.  CTA was performed which showed pulmonary hypertension and cardiomegaly.  Patient does report that she snores at night and has daytime fatigue and falls asleep easily.  Patient was admitted and given IV diuretics and seen and evaluated by cardiology who recommended adding Aldactone and stopping the hydrochlorothiazide with the kidney stones.  Patient has no pain from the kidney stone standpoint and is doing well.  Has not passed the stone but having some hematuria.  She has not required any further pain medications.  Strongly recommending outpatient sleep study and referral as this is likely the reason for her pulmonary hypertension and hypoxemia.  She is currently in stable condition for discharge.    39 minutes spent in discharge timing

## 2024-04-14 NOTE — CARE PLAN
The patient's goals for the shift include pass kidney stone.    The clinical goals for the shift include pt labs and vitals will maintain normal thru shift    Over the shift, the patient did not make progress toward the following goals. Barriers to progression include n/a. Recommendations to address these barriers include n/a.

## 2024-04-14 NOTE — NURSING NOTE
Patient completed ambulatory pulse ox. on room air, oxygen saturation 76%. Patient complained of SOB. Resting on room air she was stating 80%. 2 liters O2 was applied for recovery, she then stated 90% on 2L NC. Resting on 2L O2 nasal canula she was stating 94%.

## 2024-04-14 NOTE — CONSULTS
Inpatient consult to Cardiology  Consult performed by: Srinivas Marquez MD  Consult ordered by: Juan Kaur MD      Reason for Consult: Possible pulmonary hypertension    Assessment/Plan:    Possible pulmonary hypertension: Await echocardiogram results.  CT scan is suggestive of pulmonary hypertension.  She has risk factors for WHO group II and III pulmonary hypertension.  She has no risk factors for pulmonary arterial hypertension that I can see.  PFTs in the past have suggested restrictive lung disease consistent with obesity hypoventilation and/or sleep apnea.  With hypertension she could of course have some component of diastolic heart failure.  CTA of the chest was negative for pulmonary embolism.  I do not think of nuclear medicine test for chronic thromboembolic disease is needed as she has no history of DVT/PE  Hypertension: Continue angiotensin receptor blocker therapy.  With history of kidney stones I would discontinue the hydrochlorothiazide.  If she ultimately ends up having diastolic dysfunction which I suspect she will we will probably add SGLT-2 inhibitor and spironolactone.    Peripheral IV 04/13/24 20 G Left Forearm (Active)   Site Assessment Clean;Dry;Intact 04/13/24 2017   Dressing Type Transparent;Tape 04/13/24 2017   Line Status Capped;Flushed;Saline locked 04/13/24 2017   Cap Change Cap changed 04/13/24 2017   Dressing Status Clean;Dry 04/13/24 2017   Number of days: 1       History Of Present Illness:    Libertad Rees is a 64 y.o. female presenting with kidney stone and hypoxia.  CT of the chest showed evidence of right pulmonary artery enlargement and cardiomegaly bothersome for pulmonary hypertension.  Patient has no history of DVT or pulmonary emboli.  She has a history of sleep apnea untreated.  She has a history of PFT showing restrictive lung disease in the past.  She is not on oxygen at home.  She has no history of COPD or asthma though as a child she apparently had some wheezing.   She does have a history of hypertension on irbesartan and hydrochlorothiazide.  No syncopal events.  No chest pain.  No lower extremity edema.  The hydrochlorothiazide was simply used for blood pressure control.       Past Medical History:  She has a past medical history of Adrenal adenoma, Adverse effect of anesthesia, Hypertension, Lipoma, Nephrolithiasis, and Post-menopausal bleeding (12/27/2023).    Past Surgical History:  She has a past surgical history that includes Other surgical history (12/22/2022); Lithotripsy; Shoulder surgery (Right); and Lipoma resection.    Problem List:  Patient Active Problem List   Diagnosis    Postmenopausal bleeding    Hypertension, essential, benign    Adrenal adenoma    BMI 50.0-59.9, adult (Multi)    Hypoxemia    JUVENCIO (obstructive sleep apnea)    Morbid obesity (Multi)    Kidney stone    Acute respiratory failure with hypoxemia (Multi)       Social History:  She reports that she has quit smoking. Her smoking use included cigarettes. She has never used smokeless tobacco. She reports that she does not currently use alcohol. She reports that she does not use drugs.    Family History:  Family History   Problem Relation Name Age of Onset    Hypertension Mother      Stroke Mother      Diabetes Brother      Hypertension Brother          Allergies:  Banana, Kiwi, and Latex    Inpatient Medications:  Scheduled medications   Medication Dose Route Frequency    enoxaparin  60 mg subcutaneous q12h HONORIO    furosemide  40 mg intravenous q12h    losartan  100 mg oral Daily    oxygen   inhalation Continuous - Inhalation    perflutren lipid microspheres  0.5-10 mL of dilution intravenous Once in imaging    perflutren protein A microsphere  0.5 mL intravenous Once in imaging    polyethylene glycol  17 g oral Daily    sulfur hexafluoride microsphr  2 mL intravenous Once in imaging    tamsulosin  0.4 mg oral Daily     PRN medications   Medication    acetaminophen    Or    acetaminophen    Or     acetaminophen    benzocaine-menthol    guaiFENesin    melatonin    ondansetron ODT    Or    ondansetron    promethazine    Or    promethazine     Continuous Medications   Medication Dose Last Rate     Outpatient Medications:  Current Outpatient Medications   Medication Instructions    hydroCHLOROthiazide (MICROZIDE) 12.5 mg, oral, Daily    irbesartan (AVAPRO) 300 mg, oral, Daily    miSOPROStoL (Cytotec) 200 mcg tablet Insert 2 tablets in vagina prior to procedure    naproxen (NAPROSYN) 500 mg, oral, 2 times daily with meals    ondansetron ODT (ZOFRAN-ODT) 4 mg, oral, Every 8 hours PRN    oxyCODONE-acetaminophen (Percocet) 5-325 mg tablet 1 tablet, oral, Every 6 hours PRN    tamsulosin (FLOMAX) 0.4 mg, oral, Daily       Last Recorded Vitals:  Vitals:    04/13/24 2000 04/13/24 2008 04/13/24 2156 04/14/24 0300   BP: 171/84 171/84 157/83 135/68   BP Location: Right arm  Right arm Right arm   Patient Position: Lying  Lying Lying   Pulse: 98 98 91 83   Resp: 20 20 20 20   Temp: 36.9 °C (98.4 °F)   36.2 °C (97.2 °F)   TempSrc: Temporal   Temporal   SpO2: 96%  96% 92%   Weight:       Height:         Last I/O:  I/O last 3 completed shifts:  In: - (0 mL/kg)   Out: 1480 (10.6 mL/kg) [Urine:1480 (0.3 mL/kg/hr)]  Weight: 139.3 kg     Physical Exam  Vitals reviewed.   Constitutional:       Appearance: Normal appearance. She is morbidly obese.   Eyes:      Pupils: Pupils are equal, round, and reactive to light.   Neck:      Vascular: No JVD.   Cardiovascular:      Rate and Rhythm: Normal rate and regular rhythm.      Pulses: Normal pulses.      Heart sounds: No murmur heard.     No gallop.   Pulmonary:      Effort: No respiratory distress.      Breath sounds: No wheezing or rales.   Abdominal:      General: Abdomen is flat. There is no distension.      Palpations: Abdomen is soft.   Musculoskeletal:         General: No swelling.      Right lower leg: No edema.      Left lower leg: No edema.   Neurological:      General: No focal  deficit present.      Mental Status: She is alert.   Psychiatric:         Mood and Affect: Mood normal.            Last Labs:  CBC - 4/13/2024:  7:33 AM  12.0 14.6 298    45.4      CMP - 4/13/2024:  7:33 AM  9.7 7.5 16 --- 0.9   _ 3.9 12 95      Troponin I, High Sensitivity   Date/Time Value Ref Range Status   04/13/2024 03:16 PM 8 0 - 13 ng/L Final   04/13/2024 11:51 AM 9 0 - 13 ng/L Final     LDL Calculated   Date/Time Value Ref Range Status   04/11/2024 02:06  (H) <=99 mg/dL Final     Comment:                                 Near   Borderline      AGE      Desirable  Optimal    High     High     Very High     0-19 Y     0 - 109     ---    110-129   >/= 130     ----    20-24 Y     0 - 119     ---    120-159   >/= 160     ----      >24 Y     0 -  99   100-129  130-159   160-189     >/=190       VLDL   Date/Time Value Ref Range Status   04/11/2024 02:06 PM 25 0 - 40 mg/dL Final       EKG:   Encounter Date: 03/29/24   ECG 12 lead (Clinic Performed)    Narrative    Normal sinus rhythm.      ECHO: Pending      Srinivas Marquez MD

## 2024-04-14 NOTE — DISCHARGE SUMMARY
Discharge Diagnosis  Hypoxemia    Issues Requiring Follow-Up  Follow-up with primary care provider after discharge  Recommending outpatient sleep study  If you do not pass the kidney stone and have recurrence of pain, please seek medical attention and see urologist for potential lithotripsy or stent placement    Discharge Meds     Your medication list        START taking these medications        Instructions Last Dose Given Next Dose Due   acetaminophen 325 mg tablet  Commonly known as: Tylenol      Take 2 tablets (650 mg) by mouth every 8 hours if needed for mild pain (1 - 3), moderate pain (4 - 6), headaches or fever (temp greater than 38.0 C).       naproxen 500 mg tablet  Commonly known as: Naprosyn      Take 1 tablet (500 mg) by mouth 2 times a day with meals for 7 days.       ondansetron ODT 4 mg disintegrating tablet  Commonly known as: Zofran-ODT      Take 1 tablet (4 mg) by mouth every 8 hours if needed for nausea or vomiting for up to 15 doses.       oxyCODONE-acetaminophen 5-325 mg tablet  Commonly known as: Percocet      Take 1 tablet by mouth every 6 hours if needed for severe pain (7 - 10) for up to 12 doses.       spironolactone 25 mg tablet  Commonly known as: Aldactone      Take 1 tablet (25 mg) by mouth once daily.       tamsulosin 0.4 mg 24 hr capsule  Commonly known as: Flomax      Take 1 capsule (0.4 mg) by mouth once daily for 30 doses.              CONTINUE taking these medications        Instructions Last Dose Given Next Dose Due   irbesartan 300 mg tablet  Commonly known as: Avapro      TAKE 1 TABLET BY MOUTH EVERY DAY              STOP taking these medications      hydroCHLOROthiazide 12.5 mg tablet  Commonly known as: Microzide        miSOPROStoL 200 mcg tablet  Commonly known as: Cytotec                  Where to Get Your Medications        These medications were sent to [facility]/pharmacy #[id number] - [place], [place] - [address] AT [facility]  [address] [facility]  [address]      Phone: [phone]   acetaminophen 325 mg tablet  naproxen 500 mg tablet  ondansetron ODT 4 mg disintegrating tablet  oxyCODONE-acetaminophen 5-325 mg tablet  spironolactone 25 mg tablet  tamsulosin 0.4 mg 24 hr capsule         Test Results Pending At Discharge  Pending Labs       No current pending labs.            Hospital Course  Libertad Rees is a 64 y.o. female with past medical history of morbid obesity, abnormal uterine bleeding, hypertension, untreated sleep apnea, history of kidney stones requiring lithotripsy presented to emergency department for flank pain.  Patient has been dealing with abnormal uterine bleeding and getting blood work and testing done and later noticed flank pain.  Patient thought it would get better but lasted for 5  hours prompting for an ER visit.  Here in the emergency department patient was found to have kidney stone and her symptoms improved after receiving Toradol and morphine and they were about to discharge her.  Started having hypoxemia and was maintaining saturations low.  They did multiple ambulatory pulse ox study and she was placed on supplemental O2 and referred to hospitalist service.  CTA was performed which showed pulmonary hypertension and cardiomegaly.  Patient does report that she snores at night and has daytime fatigue and falls asleep easily.  Patient was admitted and given IV diuretics and seen and evaluated by cardiology who recommended adding Aldactone and stopping the hydrochlorothiazide with the kidney stones.  Patient has no pain from the kidney stone standpoint and is doing well.  Has not passed the stone but having some hematuria.  She has not required any further pain medications.  Strongly recommending outpatient sleep study and referral as this is likely the reason for her pulmonary hypertension and hypoxemia.  She is currently in stable condition for discharge.  Patient also did qualify for 2 L nasal cannula based on ambulatory pulse ox  study and dropping desaturation down to 79% with exertion.  She will be set up for 2 L nasal cannula on discharge.    39 minutes spent in discharge timing    Pertinent Physical Exam At Time of Discharge  General: Not in acute distress, alert.  Morbidly obese female  HEENT: PERRLA, head intact and normocephalic  Neck: Normal to inspection  Lungs: Clear to auscultation, work of breathing within normal limit  Cardiac: Regular rate and rhythm  Abdomen: Soft nontender, positive bowel sounds  : Exam deferred  Skin: Intact  Hematology: No petechia or excessive ecchymosis  Musculoskeletal: Without significant trauma  Neurological: Alert awake oriented, no focal deficit, cranial nerves grossly intact  Psych: No suicidal ideation or homicidal ideation    Outpatient Follow-Up  Future Appointments   Date Time Provider Department [place]   5/9/2024  9:10 AM Melony Dorantes MD [id number] [facility]   5/29/2024 11:00 AM [facility] [place] [id number] STONE [id number] [facility] [place] Robyn Kaur MD

## 2024-04-15 ENCOUNTER — PATIENT OUTREACH (OUTPATIENT)
Dept: CARE COORDINATION | Facility: CLINIC | Age: 64
End: 2024-04-15
Payer: COMMERCIAL

## 2024-04-15 LAB
AORTIC VALVE MEAN GRADIENT: 6.6 MMHG
AORTIC VALVE PEAK VELOCITY: 1.72 M/S
AV PEAK GRADIENT: 11.8 MMHG
EJECTION FRACTION APICAL 4 CHAMBER: 64
LEFT ATRIUM VOLUME AREA LENGTH INDEX BSA: 20.4 ML/M2
LEFT VENTRICLE INTERNAL DIMENSION DIASTOLE: 3.99 CM (ref 3.5–6)
LV EJECTION FRACTION BIPLANE: 66 %
MITRAL VALVE E/A RATIO: 1.17
MITRAL VALVE E/E' RATIO: 10.42
RIGHT VENTRICLE FREE WALL PEAK S': 23 CM/S
RIGHT VENTRICLE PEAK SYSTOLIC PRESSURE: 22.3 MMHG
TRICUSPID ANNULAR PLANE SYSTOLIC EXCURSION: 2.5 CM

## 2024-04-15 NOTE — PROGRESS NOTES
Discharge Facility: [facility]  Discharge Diagnosis: Hypoxemia  Admission Date: 4/13/2024  Discharge Date: 4/14/2024    PCP Appointment Date:  -4/17/2024 1300    Specialist Appointment Date:  -4/16/2024 1430 urology     Hospital Encounter and Summary: Linked     See discharge assessment below for further details    Engagement  Call Start Time: 1043 (4/15/2024 10:47 AM)    Medications  Medications reviewed with patient/caregiver?: Yes (new prescriptions reviewed; spironolactone, tamsulosin, zofran, percocet, naproxen, tylenol) (4/15/2024 10:47 AM)  Is the patient having any side effects they believe may be caused by any medication additions or changes?: No (4/15/2024 10:47 AM)  Does the patient have all medications ordered at discharge?: Yes (4/15/2024 10:47 AM)  Care Management Interventions: No intervention needed (4/15/2024 10:47 AM)  Is the patient taking all medications as directed (includes completed medication regime)?: No (4/15/2024 10:47 AM)  What is preventing the patient from taking all medications as directed?: Desires to consult PCP first (pt would like to discuss naproxen) (4/15/2024 10:47 AM)    Appointments  Does the patient have a primary care provider?: Yes (4/15/2024 10:47 AM)  Care Management Interventions: Verified appointment date/time/provider (4/15/2024 10:47 AM)  Has the patient kept scheduled appointments due by today?: Yes (4/15/2024 10:47 AM)    Self Management  Has home health visited the patient within 72 hours of discharge?: Not applicable (4/15/2024 10:47 AM)    Patient Teaching  Does the patient have access to their discharge instructions?: Yes (4/15/2024 10:47 AM)  Care Management Interventions: Reviewed instructions with patient (4/15/2024 10:47 AM)  What is the patient's perception of their health status since discharge?: Improving (4/15/2024 10:47 AM)  Is the patient/caregiver able to teach back the hierarchy of who to call/visit for symptoms/problems? PCP, Specialist, Home Health  nurse, Urgent Care, ED, 911: Yes (4/15/2024 10:47 AM)    Wrap Up  Wrap Up Additional Comments: Pt was admitted to [facility] 4/13-4/14/2024 for hypoxemia. Pt went into the hospital with flank pain. Pt found to have a kidney stone. Pt discharged with 6 new prescriptions- tylenol, naproxen, zofran, percocet, aldactone, and flomax- discussed medication. Pt reports she is taking the aldactone and flomax but has not needed the prn medication. Pt states she would like to discuss naproxen with PCP before taking. Pt reports understanding of discharge instructions Pt discharged with home oxygen; she reports that she has all of her equipment. Pt is wearing 3L continuously and states she checked her pulse ox and is maintaining above 95% with ambulation. Pt states on 2L she was dropping to the 80s. Pt reports her BP this morning was 144/76 with a heart rate of 81. Pt denies any pain from kidney stones and states she has not had any nausea since discharge. Pt has urology follow up 4/16/2024 1430. Verified PCP appt 4/17/2024 1300. Pt denies any questions, needs, or concerns at this time. She is encouraged to call if questions or needs arise. (4/15/2024 10:47 AM)  Call End Time: 1102 (4/15/2024 10:47 AM)

## 2024-04-16 ENCOUNTER — APPOINTMENT (OUTPATIENT)
Dept: UROLOGY | Facility: HOSPITAL | Age: 64
End: 2024-04-16
Payer: COMMERCIAL

## 2024-04-17 ENCOUNTER — OFFICE VISIT (OUTPATIENT)
Dept: PRIMARY CARE | Facility: CLINIC | Age: 64
End: 2024-04-17
Payer: COMMERCIAL

## 2024-04-17 VITALS
HEART RATE: 82 BPM | BODY MASS INDEX: 50.02 KG/M2 | DIASTOLIC BLOOD PRESSURE: 84 MMHG | OXYGEN SATURATION: 97 % | HEIGHT: 64 IN | TEMPERATURE: 97.8 F | SYSTOLIC BLOOD PRESSURE: 136 MMHG | RESPIRATION RATE: 18 BRPM | WEIGHT: 293 LBS

## 2024-04-17 DIAGNOSIS — G47.8 NON-RESTORATIVE SLEEP: ICD-10-CM

## 2024-04-17 DIAGNOSIS — I10 HYPERTENSION, ESSENTIAL, BENIGN: ICD-10-CM

## 2024-04-17 DIAGNOSIS — Z09 HOSPITAL DISCHARGE FOLLOW-UP: Primary | ICD-10-CM

## 2024-04-17 DIAGNOSIS — G47.33 OSA (OBSTRUCTIVE SLEEP APNEA): ICD-10-CM

## 2024-04-17 DIAGNOSIS — J96.01 ACUTE RESPIRATORY FAILURE WITH HYPOXEMIA (MULTI): ICD-10-CM

## 2024-04-17 DIAGNOSIS — I27.20 PULMONARY HTN (MULTI): ICD-10-CM

## 2024-04-17 PROCEDURE — 3008F BODY MASS INDEX DOCD: CPT | Performed by: FAMILY MEDICINE

## 2024-04-17 PROCEDURE — 3079F DIAST BP 80-89 MM HG: CPT | Performed by: FAMILY MEDICINE

## 2024-04-17 PROCEDURE — 99495 TRANSJ CARE MGMT MOD F2F 14D: CPT | Performed by: FAMILY MEDICINE

## 2024-04-17 PROCEDURE — 3075F SYST BP GE 130 - 139MM HG: CPT | Performed by: FAMILY MEDICINE

## 2024-04-17 NOTE — PROGRESS NOTES
"Patient: Libertad Rees  : 1960  PCP: Stephenie Levine DO  MRN: [id number]  Program: Transitional Care Management  Status: Enrolled  Effective Dates: 4/15/2024 - present  Responsible Staff: Brenda Austin RN  Social Determinants to be Addressed: No information to display         Libertad Rees is a 64 y.o. female presenting today for follow-up after being discharged from the hospital 4 days ago. The main problem requiring admission was hypoxemia and kidney stones. The discharge summary and/or Transitional Care Management documentation was reviewed. Medication reconciliation was performed as indicated via the \"Teja as Reviewed\" timestamp.   From Hospital:  Libertad Rees is a 64 y.o. female with past medical history of morbid obesity, abnormal uterine bleeding, hypertension, untreated sleep apnea, history of kidney stones requiring lithotripsy presented to emergency department for flank pain.  Patient has been dealing with abnormal uterine bleeding and getting blood work and testing done and later noticed flank pain.  Patient thought it would get better but lasted for 5  hours prompting for an ER visit.  Here in the emergency department patient was found to have kidney stone and her symptoms improved after receiving Toradol and morphine and they were about to discharge her.  Started having hypoxemia and was maintaining saturations low.  They did multiple ambulatory pulse ox study and she was placed on supplemental O2 and referred to hospitalist service.  CTA was performed which showed pulmonary hypertension and cardiomegaly.  Patient does report that she snores at night and has daytime fatigue and falls asleep easily.  Patient was admitted and given IV diuretics and seen and evaluated by cardiology who recommended adding Aldactone and stopping the hydrochlorothiazide with the kidney stones.  Patient has no pain from the kidney stone standpoint and is doing well.  Has not passed the stone but having some " "hematuria.  She has not required any further pain medications.  Strongly recommending outpatient sleep study and referral as this is likely the reason for her pulmonary hypertension and hypoxemia.  She is currently in stable condition for discharge.  Patient also did qualify for 2 L nasal cannula based on ambulatory pulse ox study and dropping desaturation down to 79% with exertion.  She will be set up for 2 L nasal cannula on discharge.       iLbertad Rees was contacted by Transitional Care Management services two days after her discharge. This encounter and supporting documentation was reviewed.        /84 (BP Location: Left arm, Patient Position: Sitting)   Pulse 82   Temp 36.6 °C (97.8 °F)   Resp 18   Ht 1.626 m (5' 4\")   Wt 140 kg (308 lb)   SpO2 97%   BMI 52.87 kg/m²     Physical Exam  Constitutional:       Appearance: Normal appearance. She is obese.   HENT:      Head: Normocephalic and atraumatic.      Right Ear: Tympanic membrane normal.      Left Ear: Tympanic membrane normal.      Nose: Nose normal.      Mouth/Throat:      Mouth: Mucous membranes are moist.   Cardiovascular:      Rate and Rhythm: Normal rate and regular rhythm.   Pulmonary:      Effort: Pulmonary effort is normal.      Breath sounds: Normal breath sounds.   Musculoskeletal:      Right lower leg: Edema present.      Left lower leg: Edema present.   Neurological:      Mental Status: She is alert.         The complexity of medical decision making for this patient's transitional care is moderate.    Assessment/Plan   Problem List Items Addressed This Visit             ICD-10-CM    Hypertension, essential, benign I10    Relevant Orders    Referral to Cardiology    Acute respiratory failure with hypoxemia (Multi) J96.01    Relevant Orders    Referral to Pulmonology     Other Visit Diagnoses         Codes    Hospital discharge follow-up    -  Primary Z09    Pulmonary HTN (Multi)     I27.20    Relevant Orders    Referral to " Cardiology    Referral to Pulmonology    Non-restorative sleep     G47.8    Relevant Orders    Referral to Pulmonology

## 2024-04-18 ENCOUNTER — TELEPHONE (OUTPATIENT)
Dept: PRIMARY CARE | Facility: CLINIC | Age: 64
End: 2024-04-18
Payer: COMMERCIAL

## 2024-04-18 DIAGNOSIS — R09.02 HYPOXEMIA: Primary | ICD-10-CM

## 2024-04-18 NOTE — TELEPHONE ENCOUNTER
Per [facility] [place] [phone]  fax [phone] in order to receive  a portable oxygen concentrator PT would have to use  8 portable Oxygen tanks per month for 3 months consistently.   PT aware

## 2024-04-18 NOTE — TELEPHONE ENCOUNTER
Pt called to clarify her appointments with cardiology and pulmonology.  She was reassured she has an appointment with cardiology on 4/25/24 and pulmonology will contact her.  She said they have once already yesterday.      She is asking for a portable concentrator for her oxygen .  Is this something Dr. Levine can do for her?

## 2024-04-18 NOTE — TELEPHONE ENCOUNTER
Per pt, her NP pulmonology appt is not until July 3rd. She is asking if it's OK to wait that long. Please advise.

## 2024-04-19 ENCOUNTER — PATIENT OUTREACH (OUTPATIENT)
Dept: CARE COORDINATION | Facility: CLINIC | Age: 64
End: 2024-04-19
Payer: COMMERCIAL

## 2024-04-19 ENCOUNTER — TELEPHONE (OUTPATIENT)
Dept: PRIMARY CARE | Facility: CLINIC | Age: 64
End: 2024-04-19
Payer: COMMERCIAL

## 2024-04-19 PROBLEM — R22.31 MASS OF RIGHT AXILLA: Status: ACTIVE | Noted: 2024-04-19

## 2024-04-19 PROBLEM — E28.2 POLYCYSTIC OVARIES: Status: ACTIVE | Noted: 2024-04-19

## 2024-04-19 PROBLEM — R22.2 CHEST WALL MASS: Status: ACTIVE | Noted: 2024-04-19

## 2024-04-19 NOTE — PROGRESS NOTES
Call regarding appt. with PCP on 4/17/2024 after hospitalization.  At time of outreach call the patient feels as if their condition has improved since last visit.  Reviewed the PCP appointment with the pt and addressed any questions or concerns.    Verified cardiology appt 4/25/2024 0830. Pt has a pulmonology appt 7/3/2024.    Pt reports some concerns regarding borderline LDL 145mg/dL. Discussed diet choices and healthy weight. Will send message to see if PCP has any other recommendations at this time.    Pt states she has a call out to schedule a sleep study and is waiting to hear back.  Pt denies any questions, needs, or concerns at this time. Pt encouraged to call if questions or needs arise.

## 2024-04-19 NOTE — TELEPHONE ENCOUNTER
Per pt, she wants our office to be aware that she will be getting her portable Oxygen Concentrator from a new company - [employer]. They will be faxing the paper work over.

## 2024-04-25 ENCOUNTER — OFFICE VISIT (OUTPATIENT)
Dept: CARDIOLOGY | Facility: CLINIC | Age: 64
End: 2024-04-25
Payer: COMMERCIAL

## 2024-04-25 VITALS
BODY MASS INDEX: 50.02 KG/M2 | OXYGEN SATURATION: 93 % | SYSTOLIC BLOOD PRESSURE: 150 MMHG | WEIGHT: 293 LBS | HEART RATE: 88 BPM | HEIGHT: 64 IN | DIASTOLIC BLOOD PRESSURE: 88 MMHG

## 2024-04-25 DIAGNOSIS — I10 HYPERTENSION, ESSENTIAL, BENIGN: ICD-10-CM

## 2024-04-25 DIAGNOSIS — E66.01 MORBID OBESITY (MULTI): Primary | ICD-10-CM

## 2024-04-25 DIAGNOSIS — G47.33 OSA (OBSTRUCTIVE SLEEP APNEA): ICD-10-CM

## 2024-04-25 DIAGNOSIS — D35.00 ADRENAL ADENOMA, UNSPECIFIED LATERALITY: ICD-10-CM

## 2024-04-25 DIAGNOSIS — R09.02 HYPOXIA: ICD-10-CM

## 2024-04-25 DIAGNOSIS — E28.2 POLYCYSTIC OVARIES: ICD-10-CM

## 2024-04-25 DIAGNOSIS — I27.20 PULMONARY HTN (MULTI): ICD-10-CM

## 2024-04-25 DIAGNOSIS — R06.09 DOE (DYSPNEA ON EXERTION): ICD-10-CM

## 2024-04-25 DIAGNOSIS — N20.0 NEPHROLITHIASIS: ICD-10-CM

## 2024-04-25 DIAGNOSIS — R06.83 SNORES: ICD-10-CM

## 2024-04-25 DIAGNOSIS — R94.31 ABNORMAL EKG: ICD-10-CM

## 2024-04-25 PROBLEM — G47.8 NON-RESTORATIVE SLEEP: Status: ACTIVE | Noted: 2024-04-25

## 2024-04-25 PROCEDURE — 99215 OFFICE O/P EST HI 40 MIN: CPT | Performed by: INTERNAL MEDICINE

## 2024-04-25 PROCEDURE — 3008F BODY MASS INDEX DOCD: CPT | Performed by: INTERNAL MEDICINE

## 2024-04-25 PROCEDURE — 3079F DIAST BP 80-89 MM HG: CPT | Performed by: INTERNAL MEDICINE

## 2024-04-25 PROCEDURE — 3077F SYST BP >= 140 MM HG: CPT | Performed by: INTERNAL MEDICINE

## 2024-04-25 RX ORDER — METOPROLOL SUCCINATE 50 MG/1
50 TABLET, EXTENDED RELEASE ORAL DAILY
Qty: 30 TABLET | Refills: 11 | Status: SHIPPED | OUTPATIENT
Start: 2024-04-25 | End: 2025-04-25

## 2024-04-25 RX ORDER — ACETAMINOPHEN 500 MG
5 TABLET ORAL DAILY PRN
COMMUNITY
Start: 2024-04-13 | End: 2024-04-25 | Stop reason: ALTCHOICE

## 2024-04-25 RX ORDER — LOSARTAN POTASSIUM 100 MG/1
100 TABLET ORAL
COMMUNITY
Start: 2024-04-13 | End: 2024-04-25 | Stop reason: WASHOUT

## 2024-04-25 ASSESSMENT — PAIN SCALES - GENERAL: PAINLEVEL: 0-NO PAIN

## 2024-04-25 NOTE — PROGRESS NOTES
CARDIOLOGY CONSULTATION NOTE       Patient:    Libertad Rees    YOB: 1960  MRN:    [id number]    Date:   2024     Reason for Visit: Initial cardiac consultation for dyspnea on exertion and hypoxia.     IMPRESSION:      Shortness of breath  Snoring with daytime somnolence  Palpitations  Hematuria  Hypertension  Abnormal EKG  Cardiomegaly by CT, none reported by echocardiogram 2024.  Normal LV systolic function ejection fraction 65 to 70%, echocardiogram 2024  LV diastolic dysfunction  Left atrial enlargement  Obstructive sleep apnea not on CPAP  History of restrictive lung disease.  Negative CTA of the chest for pulmonary embolism, 2023  Dilated right main pulmonary artery measuring 3.1 cm, CTA 2023  Morbid obesity  History of kidney stones post lithotripsy  Polycystic ovarian disease.  Adrenal adenoma.  Chronic sinusitis, history  Past tobacco use  Otherwise as per assessment below.    RECOMMENDATIONS:      Patient has above-noted history and findings, her symptoms are concerning for possible underlying cardiovascular cause and would suggest the followin-hour Holter monitor, CT calcium score at this time.  She is scheduled for in lab sleep study already.  Would obtain a complete pulmonary function study prior to her follow-up with her scheduled pulmonary consultation.  Further recommendations including possible cardiac stress testing as warranted.    Would suggest adding Toprol-XL 50 mg daily as tolerated.  She will continue her other medications.  Refills were provided.    Exercise dietary program was encouraged.  Weight loss.    Hydration was encouraged.    [employer] use was offered.    We will plan to see back after the above testing with Laboratory Studies and ECG as noted below.     Patient will follow up with their primary physician for general care.    The patient knows to contact medical care earlier if need be.      HPI:     Libertad Rees was seen in cardiac  evaluation at the  Cardiology office April 25, 2024.      The patients problems are listed as in the impression above.    Electronic medical records reviewed.    The patient is a pleasant 64-year-old hypertensive morbidly obese woman who was recently admitted to the hospital with flank pain and kidney stones.  Patient was noted to be hypoxic at the same time.  She has not passed her stone as of yet.  She is not symptomatic currently.  She saw cardiology Dr. Jeffery Marquez.  Echocardiogram was performed which noted preserved left ventricular function, LVEF 65 to 70% with diastolic dysfunction and some left atrial enlargement.  There is no evidence of patent foramen ovale.  Pulmonary hypertension was questioned but RVSP was 22.    A CT of the chest was done which was negative for pulmonary embolism.  It did note a mildly enlarged pulmonary artery however.    Patient presents now to establish cardiovascular care and to evaluate possible cardiac cause of her hypoxia and shortness of breath.  She is on oxygen now.  She has a history of restrictive lung disease.    She does note palpitations at times.  She has shortness of breath with exertion and at rest.  She has been told that she snores.  She has daytime somnolence.  She has had some hematuria.    She has no other significant cardiovascular plaints.    Patient denies Chest Pain, Lightheadedness, Dizziness, TIA or CVA symptoms.  No CHF or Edema.  No GI Bleeding Issues. No Recent Fever or Chills.     Cardiovascular and general review of systems is otherwise negative.    A 14-system review is otherwise negative, other than noted.    ALLERGIES:     Allergies   Allergen Reactions    Banana Unknown    Kiwi Unknown    Latex Rash        MEDICATIONS:     Current Outpatient Medications   Medication Instructions    acetaminophen (TYLENOL) 650 mg, oral, Every 8 hours PRN    irbesartan (AVAPRO) 300 mg, oral, Daily    losartan (COZAAR) 100 mg, oral, Daily RT    ondansetron ODT  (ZOFRAN-ODT) 4 mg, oral, Every 8 hours PRN    oxyCODONE-acetaminophen (Percocet) 5-325 mg tablet 1 tablet, oral, Every 6 hours PRN    spironolactone (ALDACTONE) 25 mg, oral, Daily    tamsulosin (FLOMAX) 0.4 mg, oral, Daily       PAST MEDICAL HISTORY:   As per impression above.  No other significant past medical or surgical history appreciated.    SOCIAL HISTORY:   Past smoker.  Denies alcohol or illicit drug use.  .  Retired special .  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    FAMILY HISTORY:   Negative family history of CAD  Mother had cerebrovascular accident.    VITALS:     There were no vitals filed for this visit.    Wt Readings from Last 4 Encounters:   04/17/24 140 kg (308 lb)   04/13/24 139 kg (307 lb 1.6 oz)   04/11/24 138 kg (305 lb 5.4 oz)   03/29/24 137 kg (301 lb 1.6 oz)       PHYSICAL EXAMINATION:      General: No acute distress. Vital signs as noted. Alert and oriented.  On oxygen  Head And Neck Examination: No jugular venous distention, no carotid bruits, no mass. Carotid upstrokes preserved. Oral mucosa moist.  No xanthelasma. Head and neck examination otherwise unremarkable.  Lungs: Clear to auscultation and percussion. No wheezes, no rales,  and no rhonchi.  Chest: Excursion appeared to be normal. No chest wall tenderness on palpation.  Heart: Normal S1 and S2. No S3. No S4. No rub. Grade 1/6 systolic murmur, best heard at the left sternal border. Point of maximal impulse was within normal limits.  Abdomen: Soft. Nontender. No organomegaly. No bruits. No masses.  Morbidly obese.  Extremities: No bipedal edema. No clubbing. No cyanosis.  Pulses are strong throughout. No bruits.  Musculoskeletal Exam: No ulcers, otherwise unremarkable.  Neuro: Neurologically appeared grossly intact.    ELECTROCARDIOGRAM:      4/13/2024: Sinus rhythm, rate 90.  Low voltage, poor R wave anterior progression, cannot exclude prior anterior myocardial infarction.,  Inferior Q waves suggesting possible  prior inferior myocardial infarction.  Nonspecific ST-T wave changes.    CARDIAC TESTING:      Echocardiogram: 4/2024.  Ejection fraction 65 to 70%.  Diastolic dysfunction.  Left atrial enlargement.  No evidence of patent foramen ovale.  RVSP 22.  No valvular heart disease.    CT scan of the chest: Pulmonary embolism ruled out, increased pulmonary artery dilatation noted.    LABORATORY DATA:      CBC:   Lab Results   Component Value Date    WBC 7.5 04/14/2024    RBC 4.70 04/14/2024    HGB 13.5 04/14/2024    HCT 44.2 04/14/2024     04/14/2024        CMP:    Lab Results   Component Value Date     04/14/2024    K 3.7 04/14/2024    CL 98 04/14/2024    CO2 36 (H) 04/14/2024    BUN 16 04/14/2024    CREATININE 0.66 04/14/2024    GLUCOSE 128 (H) 04/14/2024    CALCIUM 9.4 04/14/2024       Lipid Profile:    Lab Results   Component Value Date    CHOL 214 (H) 04/11/2024    TRIG 125 04/11/2024    HDL 44.0 04/11/2024       Hepatic Function Panel:    Lab Results   Component Value Date    ALKPHOS 95 04/13/2024    ALT 12 04/13/2024    AST 16 04/13/2024    PROT 7.5 04/13/2024    BILITOT 0.9 04/13/2024                     PROBLEM LIST:     Patient Active Problem List   Diagnosis    Postmenopausal bleeding    Hypertension, essential, benign    Adrenal adenoma    BMI 50.0-59.9, adult (Multi)    Hypoxemia    JUVENCIO (obstructive sleep apnea)    Morbid obesity (Multi)    Kidney stone    Acute respiratory failure with hypoxemia (Multi)    Chest wall mass    Mass of right axilla    Polycystic ovaries    Hypoxia    Non-restorative sleep    Pulmonary hypertension (Multi)             Justo Sheehan MD, FACC   [facility] /  Cardiology      Of Note:  [employer] voice recognition dictation software was utilized partially in the preparation of this note, therefore, inaccuracies in spelling, word choice and punctuation may have occurred which were not recognized the time of signing.    Patient was seen and examined with total time of  visit including chart preparation, rooming, and chart completion exceeding 40 minutes.    ----

## 2024-04-26 ENCOUNTER — TELEPHONE (OUTPATIENT)
Dept: PRIMARY CARE | Facility: CLINIC | Age: 64
End: 2024-04-26
Payer: COMMERCIAL

## 2024-04-26 ENCOUNTER — TELEPHONE (OUTPATIENT)
Dept: CARDIOLOGY | Facility: CLINIC | Age: 64
End: 2024-04-26
Payer: COMMERCIAL

## 2024-04-26 NOTE — TELEPHONE ENCOUNTER
Patient was calling to see if you had seen anything from [facility] for her Oxygen.  Please call her on Monday

## 2024-04-26 NOTE — TELEPHONE ENCOUNTER
Left message for pt to return call to [facility] to schedule Calcium score test and holter monitor.

## 2024-05-09 ENCOUNTER — APPOINTMENT (OUTPATIENT)
Dept: OBSTETRICS AND GYNECOLOGY | Facility: CLINIC | Age: 64
End: 2024-05-09
Payer: COMMERCIAL

## 2024-05-10 ENCOUNTER — TELEPHONE (OUTPATIENT)
Dept: PRIMARY CARE | Facility: CLINIC | Age: 64
End: 2024-05-10
Payer: COMMERCIAL

## 2024-05-10 DIAGNOSIS — J96.01 ACUTE RESPIRATORY FAILURE WITH HYPOXEMIA (MULTI): ICD-10-CM

## 2024-05-10 RX ORDER — SPIRONOLACTONE 25 MG/1
25 TABLET ORAL DAILY
Qty: 90 TABLET | Refills: 0 | Status: SHIPPED | OUTPATIENT
Start: 2024-05-10 | End: 2024-08-08

## 2024-05-10 NOTE — TELEPHONE ENCOUNTER
Pt is calling to new script for spironolactone her insurance requires 90 day refills .  Please send to [facility]

## 2024-05-16 ENCOUNTER — HOSPITAL ENCOUNTER (OUTPATIENT)
Dept: RADIOLOGY | Facility: CLINIC | Age: 64
Discharge: HOME | End: 2024-05-16
Payer: COMMERCIAL

## 2024-05-16 ENCOUNTER — APPOINTMENT (OUTPATIENT)
Dept: RADIOLOGY | Facility: CLINIC | Age: 64
End: 2024-05-16
Payer: COMMERCIAL

## 2024-05-16 DIAGNOSIS — N20.1 CALCULUS OF URETER: ICD-10-CM

## 2024-05-16 PROCEDURE — 74176 CT ABD & PELVIS W/O CONTRAST: CPT | Performed by: RADIOLOGY

## 2024-05-16 PROCEDURE — 74176 CT ABD & PELVIS W/O CONTRAST: CPT

## 2024-05-21 ENCOUNTER — PATIENT OUTREACH (OUTPATIENT)
Dept: CARE COORDINATION | Facility: CLINIC | Age: 64
End: 2024-05-21
Payer: COMMERCIAL

## 2024-05-29 ENCOUNTER — HOSPITAL ENCOUNTER (OUTPATIENT)
Dept: RADIOLOGY | Facility: CLINIC | Age: 64
End: 2024-05-29
Payer: COMMERCIAL

## 2024-05-31 ENCOUNTER — APPOINTMENT (OUTPATIENT)
Dept: CARDIOLOGY | Facility: CLINIC | Age: 64
End: 2024-05-31
Payer: COMMERCIAL

## 2024-05-31 ENCOUNTER — HOSPITAL ENCOUNTER (OUTPATIENT)
Dept: RADIOLOGY | Facility: HOSPITAL | Age: 64
Discharge: HOME | End: 2024-05-31
Payer: COMMERCIAL

## 2024-05-31 ENCOUNTER — HOSPITAL ENCOUNTER (OUTPATIENT)
Dept: CARDIOLOGY | Facility: HOSPITAL | Age: 64
Discharge: HOME | End: 2024-05-31
Payer: COMMERCIAL

## 2024-05-31 DIAGNOSIS — D35.00 ADRENAL ADENOMA, UNSPECIFIED LATERALITY: ICD-10-CM

## 2024-05-31 DIAGNOSIS — I10 HYPERTENSION, ESSENTIAL, BENIGN: ICD-10-CM

## 2024-05-31 DIAGNOSIS — R94.31 ABNORMAL EKG: ICD-10-CM

## 2024-05-31 DIAGNOSIS — E28.2 POLYCYSTIC OVARIES: ICD-10-CM

## 2024-05-31 DIAGNOSIS — R06.09 DOE (DYSPNEA ON EXERTION): ICD-10-CM

## 2024-05-31 DIAGNOSIS — R06.83 SNORES: ICD-10-CM

## 2024-05-31 DIAGNOSIS — G47.33 OSA (OBSTRUCTIVE SLEEP APNEA): ICD-10-CM

## 2024-05-31 DIAGNOSIS — R09.02 HYPOXIA: ICD-10-CM

## 2024-05-31 DIAGNOSIS — E66.01 MORBID OBESITY (MULTI): ICD-10-CM

## 2024-05-31 DIAGNOSIS — I27.20 PULMONARY HTN (MULTI): ICD-10-CM

## 2024-05-31 DIAGNOSIS — N20.0 NEPHROLITHIASIS: ICD-10-CM

## 2024-05-31 PROCEDURE — 93225 XTRNL ECG REC<48 HRS REC: CPT

## 2024-05-31 PROCEDURE — 75571 CT HRT W/O DYE W/CA TEST: CPT

## 2024-06-06 ENCOUNTER — TELEPHONE (OUTPATIENT)
Dept: PRIMARY CARE | Facility: CLINIC | Age: 64
End: 2024-06-06
Payer: COMMERCIAL

## 2024-06-06 DIAGNOSIS — G47.33 OSA (OBSTRUCTIVE SLEEP APNEA): Primary | ICD-10-CM

## 2024-06-06 NOTE — TELEPHONE ENCOUNTER
Per pt, she would like an excuse from jury duty. She is her 's primary care giver (he has Alzheimer's) and she is also having a lot of testing done due to her breathing issues.

## 2024-06-10 ENCOUNTER — CLINICAL SUPPORT (OUTPATIENT)
Dept: SLEEP MEDICINE | Facility: CLINIC | Age: 64
End: 2024-06-10
Payer: COMMERCIAL

## 2024-06-10 DIAGNOSIS — G47.33 OSA (OBSTRUCTIVE SLEEP APNEA): ICD-10-CM

## 2024-06-10 DIAGNOSIS — J96.01 ACUTE RESPIRATORY FAILURE WITH HYPOXEMIA (MULTI): ICD-10-CM

## 2024-06-10 PROCEDURE — 95810 POLYSOM 6/> YRS 4/> PARAM: CPT | Performed by: INTERNAL MEDICINE

## 2024-06-11 VITALS — BODY MASS INDEX: 50.02 KG/M2 | WEIGHT: 293 LBS | HEIGHT: 64 IN

## 2024-06-11 ASSESSMENT — SLEEP AND FATIGUE QUESTIONNAIRES
HOW LIKELY ARE YOU TO NOD OFF OR FALL ASLEEP WHILE SITTING AND TALKING TO SOMEONE: WOULD NEVER DOZE
HOW LIKELY ARE YOU TO NOD OFF OR FALL ASLEEP WHEN YOU ARE A PASSENGER IN A CAR FOR AN HOUR WITHOUT A BREAK: WOULD NEVER DOZE
ESS-CHAD TOTAL SCORE: 2
HOW LIKELY ARE YOU TO NOD OFF OR FALL ASLEEP IN A CAR, WHILE STOPPED FOR A FEW MINUTES IN TRAFFIC: WOULD NEVER DOZE
HOW LIKELY ARE YOU TO NOD OFF OR FALL ASLEEP WHILE SITTING QUIETLY AFTER LUNCH WITHOUT ALCOHOL: WOULD NEVER DOZE
SITING INACTIVE IN A PUBLIC PLACE LIKE A CLASS ROOM OR A MOVIE THEATER: WOULD NEVER DOZE
HOW LIKELY ARE YOU TO NOD OFF OR FALL ASLEEP WHILE LYING DOWN TO REST IN THE AFTERNOON WHEN CIRCUMSTANCES PERMIT: SLIGHT CHANCE OF DOZING
HOW LIKELY ARE YOU TO NOD OFF OR FALL ASLEEP WHILE SITTING AND READING: WOULD NEVER DOZE
HOW LIKELY ARE YOU TO NOD OFF OR FALL ASLEEP WHILE WATCHING TV: SLIGHT CHANCE OF DOZING

## 2024-06-11 NOTE — PROGRESS NOTES
[facility] TECH NOTE:     Patient: Libertad Rees   MRN//AGE: [id number]  1960  64 y.o.   Technologist: Armando Frey   Room: 1   Service Date: 2024        Sleep Testing Location: [place]    Mayer: 2    TECHNOLOGIST SLEEP STUDY PROCEDURE NOTE:   This sleep study is being conducted according to the policies and procedures outlined by the AAS accreditation standards.  The sleep study procedure and processes involved during this appointment was explained to the patient/patient’s family, questions were answered. The patient/family verbalized understanding.      The patient is a 64 y.o. year old female scheduled for a Diagnostic PSG Split night with montage of:  PSG . she arrived for her appointment.      The study that was ultimately completed was a Diagnostic PSG Split night with montage of:  PSG .    The full study Was completed.  Patient questionnaires completed?: yes     Consents signed? yes    Initial Fall Risk Screening:     Libertad has not fallen in the last 6 months. her fall did not result in injury. Libertad does not have a fear of falling. She does not need assistance with sitting, standing, or walking. she does not need assistance walking in her home. she does not need assistance in an unfamiliar setting. The patient is not using an assistive device.     Brief Study observations: The patient arrived for her ordered Diagnostic PSG, wearing 2 LPM of oxygen and having an unsteady gait. The hook-up was done and the purpose of all sensors was explained. The study was started on room air, per order and protocol. A longer than normal sleep latency was seen. Once asleep, occasional mild snoring was heard. Respiratory events were seen, with frequent arousals and poor sleep efficiency with sleep fragmentation. After being asleep for a while, the patient met the order criteria of applying oxygen after >10 minutes of <88%, due to her sats being in the mid 70's. 2 LPM was applied due to the severity of her sats  and due to it being her home setting. The oxygen was later lowered to 1 LPM to try to decrease masking of events. The patient did not sleep enough by the split cut off time so PAP was not applied. All sleep stages were not seen. REM supine was not captured. The patient was observed sleeping in supine, and right side positions. NSR was observed throughout the study, with some possible arrhythmias noted in the study summary.     Deviation to order/protocol and reason: N/A      If PAP, which was preferred mask/pressure/mode: N/A      Other: None    After the procedure, the patient/family was informed to ensure followup with ordering clinician for testing results.      Technologist: Armando Frey

## 2024-06-12 ENCOUNTER — HOSPITAL ENCOUNTER (OUTPATIENT)
Dept: RESPIRATORY THERAPY | Facility: HOSPITAL | Age: 64
Discharge: HOME | End: 2024-06-12
Payer: COMMERCIAL

## 2024-06-12 ENCOUNTER — APPOINTMENT (OUTPATIENT)
Dept: CARDIOLOGY | Facility: CLINIC | Age: 64
End: 2024-06-12
Payer: COMMERCIAL

## 2024-06-12 DIAGNOSIS — R06.09 DOE (DYSPNEA ON EXERTION): ICD-10-CM

## 2024-06-12 DIAGNOSIS — I10 HYPERTENSION, ESSENTIAL, BENIGN: ICD-10-CM

## 2024-06-12 DIAGNOSIS — E66.01 MORBID OBESITY (MULTI): ICD-10-CM

## 2024-06-12 DIAGNOSIS — D35.00 ADRENAL ADENOMA, UNSPECIFIED LATERALITY: ICD-10-CM

## 2024-06-12 DIAGNOSIS — G47.33 OSA (OBSTRUCTIVE SLEEP APNEA): ICD-10-CM

## 2024-06-12 DIAGNOSIS — N20.0 NEPHROLITHIASIS: ICD-10-CM

## 2024-06-12 DIAGNOSIS — E28.2 POLYCYSTIC OVARIES: ICD-10-CM

## 2024-06-12 DIAGNOSIS — I27.20 PULMONARY HTN (MULTI): ICD-10-CM

## 2024-06-12 DIAGNOSIS — R09.02 HYPOXIA: ICD-10-CM

## 2024-06-12 DIAGNOSIS — R94.31 ABNORMAL EKG: ICD-10-CM

## 2024-06-12 DIAGNOSIS — R06.83 SNORES: ICD-10-CM

## 2024-06-12 PROCEDURE — 94640 AIRWAY INHALATION TREATMENT: CPT

## 2024-06-12 PROCEDURE — 94060 EVALUATION OF WHEEZING: CPT | Performed by: INTERNAL MEDICINE

## 2024-06-12 PROCEDURE — 94729 DIFFUSING CAPACITY: CPT | Performed by: INTERNAL MEDICINE

## 2024-06-12 PROCEDURE — 94726 PLETHYSMOGRAPHY LUNG VOLUMES: CPT | Performed by: INTERNAL MEDICINE

## 2024-06-14 ENCOUNTER — TELEPHONE (OUTPATIENT)
Dept: PRIMARY CARE | Facility: CLINIC | Age: 64
End: 2024-06-14
Payer: COMMERCIAL

## 2024-06-14 NOTE — TELEPHONE ENCOUNTER
Pt called in regards to sleep test that was done. Pt states that they told her to get a CPAP machine. Pt states the referral needs to be sent to [facility] and the fax number is [phone]. Pt also stated that the company told her that the following info needs to be on the referral pt name, Dr name, contact info, Dr signature, and a statement about the equipment needed. Pt states that it can be refilled for 99 months or lifetime pt requests that it is refilled for life

## 2024-06-15 LAB
MGC ASCENT PFT - FEV1 - POST: 1.32
MGC ASCENT PFT - FEV1 - POST: 1.32
MGC ASCENT PFT - FEV1 - PRE: 1.23
MGC ASCENT PFT - FEV1 - PRE: 1.23
MGC ASCENT PFT - FEV1 - PREDICTED: 2.32
MGC ASCENT PFT - FEV1 - PREDICTED: 2.32
MGC ASCENT PFT - FVC - POST: 1.93
MGC ASCENT PFT - FVC - POST: 1.93
MGC ASCENT PFT - FVC - PRE: 2.02
MGC ASCENT PFT - FVC - PRE: 2.02
MGC ASCENT PFT - FVC - PREDICTED: 2.93
MGC ASCENT PFT - FVC - PREDICTED: 2.93

## 2024-06-19 DIAGNOSIS — I27.20 PULMONARY HYPERTENSION (MULTI): ICD-10-CM

## 2024-06-19 DIAGNOSIS — R06.02 SOB (SHORTNESS OF BREATH): ICD-10-CM

## 2024-06-19 NOTE — PROGRESS NOTES
Reason For Consult  Assess for pulmonary arterial hypertension due to abnormal CT results, dilated pulmonary artery. Referred by Stephenie Levine DO.     History Of Present Illness  Libertad Rees is a 64 y.o. female presenting with pulmonary hypertension and cardiomegaly on CTA during recent hospital admission (4/13/2024-04/14/24). Was referred by PCP after post discharge follow up. In the emergency department patient was found to have kidney stone and her symptoms improved after receiving Toradol and morphine and they were about to discharge her. Started having hypoxemia and was maintaining saturations low. They did multiple ambulatory pulse ox study and she was placed on supplemental O2 and referred to hospitalist service. CTA was performed which showed pulmonary hypertension and cardiomegaly. Patient does report that she snores at night and has daytime fatigue and falls asleep easily. Patient was admitted and given IV diuretics and seen and evaluated by cardiology who recommended adding Aldactone and stopping the hydrochlorothiazide with the kidney stones. Patient has no pain from the kidney stone standpoint and is doing well. Has not passed the stone but having some hematuria. She has not required any further pain medications. Strongly recommending outpatient sleep study and referral as this is likely the reason for her pulmonary hypertension and hypoxemia. She is currently in stable condition for discharge. Patient also did qualify for 2 L nasal cannula based on ambulatory pulse ox study and dropping desaturation down to 79% with exertion. She will be set up for 2 L nasal cannula on discharge. Past medical history includes morbid obesity, abnormal uterine bleeding, hypertension, untreated sleep apnea and history of kidney stones requiring lithotripsy.       Interval History     Pt having shortness of breath with activity. Pt had 6 MWT, reports feeling increased shortness of breath after approximately 4  minutes. No edema noted. Not currently on any PAH medications.    Testing today: Labs, 6MWT, echo    Past Medical History    Patient Active Problem List   Diagnosis    Postmenopausal bleeding    Hypertension, essential, benign    Adrenal adenoma    BMI 50.0-59.9, adult (Multi)    Hypoxemia    JUVENCIO (obstructive sleep apnea)    Morbid obesity (Multi)    Nephrolithiasis    Acute respiratory failure with hypoxemia (Multi)    Chest wall mass    Mass of right axilla    Polycystic ovaries    Hypoxia    Non-restorative sleep    Pulmonary hypertension (Multi)    HEARD (dyspnea on exertion)    Snores    Abnormal EKG        Surgical History  She has a past surgical history that includes Other surgical history (12/22/2022); Lithotripsy; Shoulder surgery (Right); and Lipoma resection.     Social History  She reports that she has an unknown smoking status. She has never used smokeless tobacco. She reports that she does not currently use alcohol. She reports that she does not use drugs.    Family History  Family History   Problem Relation Name Age of Onset    Hypertension Mother      Stroke Mother      Diabetes Brother      Hypertension Brother          Allergies  Banana, Kiwi, and Latex    Medications    Current Outpatient Medications:     irbesartan (Avapro) 300 mg tablet, TAKE 1 TABLET BY MOUTH EVERY DAY, Disp: 90 tablet, Rfl: 3    metoprolol succinate XL (Toprol-XL) 50 mg 24 hr tablet, Take 1 tablet (50 mg) by mouth once daily. Do not crush or chew., Disp: 30 tablet, Rfl: 11    spironolactone (Aldactone) 25 mg tablet, Take 1 tablet (25 mg) by mouth once daily., Disp: 90 tablet, Rfl: 0     Review of Systems   Constitutional:  Negative for fatigue.   HENT: Negative.     Eyes: Negative.    Respiratory:  Positive for shortness of breath.    Cardiovascular:  Negative for chest pain and leg swelling.   Gastrointestinal: Negative.    Endocrine: Negative.    Genitourinary: Negative.    Musculoskeletal: Negative.    Skin: Negative.     Allergic/Immunologic: Negative.    Neurological:  Negative for dizziness, syncope and light-headedness.   Hematological: Negative.    Psychiatric/Behavioral: Negative.          Physical Exam  Constitutional:       Appearance: She is obese.   HENT:      Head: Normocephalic.      Nose: Nose normal.   Eyes:      Pupils: Pupils are equal, round, and reactive to light.   Cardiovascular:      Rate and Rhythm: Normal rate and regular rhythm.      Heart sounds: No murmur heard.  Pulmonary:      Effort: Pulmonary effort is normal.      Breath sounds: Normal breath sounds.   Abdominal:      General: Bowel sounds are normal.      Palpations: Abdomen is soft.   Musculoskeletal:      Right lower leg: No edema.      Left lower leg: No edema.   Skin:     Findings: No rash.   Neurological:      Mental Status: She is alert.   Psychiatric:         Mood and Affect: Mood normal.         Judgment: Judgment normal.          Last Recorded Vitals    Vitals:    24 1255   BP: 127/84   Pulse: 89   SpO2: 98%         Relevant Results    6MWT (7/3/2024)  SPO2: 98-94% on 4L  HR: 89 - 123  Kalie: 0 - 3  Actual Meter: 256 meters      Echo (7/3/2024) - pending      Echo (24)  Left Ventricle: Left ventricular systolic function is normal, with an estimated ejection fraction of 65-70%. There are no regional wall motion abnormalities. The left ventricular cavity size is normal. The left ventricular septal wall thickness is mildly increased. There is mildly increased left ventricular posterior wall thickness. There is mild concentric left ventricular hypertrophy. There is left ventricular concentric remodeling. Spectral Doppler shows a pseudonormal pattern of left ventricular diastolic filling.  Left Atrium: The left atrium is mild to moderately dilated. There is no evidence of a patent foramen ovale. There is no atrial septal defect present. A bubble study using agitated saline was performed. Bubble study is negative.  Right Ventricle: The  "right ventricle is mildly enlarged. There is normal right ventricular global systolic function.  Right Atrium: The right atrium is normal in size.  Aortic Valve: The aortic valve appears structurally normal. There is no evidence of aortic valve regurgitation. The peak instantaneous gradient of the aortic valve is 11.8 mmHg. The mean gradient of the aortic valve is 6.6 mmHg.  Mitral Valve: The mitral valve is normal in structure. There is no evidence of mitral valve regurgitation.  Tricuspid Valve: The tricuspid valve is structurally normal. There is trace tricuspid regurgitation. The Doppler estimated RVSP is within normal limits at 22.3 mmHg.  Pulmonic Valve: The pulmonic valve is structurally normal. There is no indication of pulmonic valve regurgitation.  Pericardium: There is no pericardial effusion noted.  Aorta: The aortic root is normal.  Systemic Veins: The inferior vena cava appears to be of normal size.     CT angio chest for PE (4/16/24)  IMPRESSION:  1. No evidence of acute central/major pulmonary embolism up to the  level of the 1st order segmental branches. The subsegmental and  peripheral branches are not well opacified and difficult to evaluate.  2. Scattered bilateral atelectatic bands.. No major pulmonary  consolidation, pleural effusion or pneumothorax.  3. Cardiomegaly.  4. Dilated right main pulmonary artery measuring 3.1 cm which could  be related to underlying pulmonary hypertension.  5. Heterogenous thyroid gland with limited evaluation given overlying  streak artifact from the IV contrast. This could be further assessed  by dedicated nonemergent thyroid ultrasound if clinically desired.        Assessment/Plan     1) Pulmonary  A. PA of 3.1 with a PA/Aorta ratio < 1 is not  consistent with PAH. Echo also is not consistent with PAH. VEST echocardiographic score is -3 consistent with her known diastolic dysfuction.   B. JUVENCIO - patient just received her PAP mask for treatment. Was told \"she " "stopped breathing more than 70 times\" 6/10/2024 report of AHI =73.9    Other medical problems as noted    Recommend:    1) No further workup for PAH  2) Treatment and eval JUVENCIO  3) Continued attention to LVH, diastolic dysfunction and dilated LA consistent with high left ventricular filling pressures.            "

## 2024-06-20 ENCOUNTER — OFFICE VISIT (OUTPATIENT)
Dept: SLEEP MEDICINE | Facility: CLINIC | Age: 64
End: 2024-06-20
Payer: COMMERCIAL

## 2024-06-20 VITALS
BODY MASS INDEX: 48.85 KG/M2 | HEART RATE: 90 BPM | SYSTOLIC BLOOD PRESSURE: 137 MMHG | WEIGHT: 286.16 LBS | DIASTOLIC BLOOD PRESSURE: 83 MMHG | HEIGHT: 64 IN | OXYGEN SATURATION: 95 %

## 2024-06-20 DIAGNOSIS — G47.33 OSA (OBSTRUCTIVE SLEEP APNEA): Primary | ICD-10-CM

## 2024-06-20 DIAGNOSIS — E66.01 MORBID OBESITY (MULTI): ICD-10-CM

## 2024-06-20 DIAGNOSIS — J96.01 ACUTE RESPIRATORY FAILURE WITH HYPOXEMIA (MULTI): ICD-10-CM

## 2024-06-20 PROCEDURE — 3079F DIAST BP 80-89 MM HG: CPT | Performed by: PHYSICIAN ASSISTANT

## 2024-06-20 PROCEDURE — 3008F BODY MASS INDEX DOCD: CPT | Performed by: PHYSICIAN ASSISTANT

## 2024-06-20 PROCEDURE — 3075F SYST BP GE 130 - 139MM HG: CPT | Performed by: PHYSICIAN ASSISTANT

## 2024-06-20 PROCEDURE — 99204 OFFICE O/P NEW MOD 45 MIN: CPT | Performed by: PHYSICIAN ASSISTANT

## 2024-06-20 PROCEDURE — 99214 OFFICE O/P EST MOD 30 MIN: CPT | Performed by: PHYSICIAN ASSISTANT

## 2024-06-20 NOTE — PROGRESS NOTES
Patient: Libertad Rees    [id number]  : 1960 -- AGE 64 y.o.    Provider: Siomara Holloway PA-C     Location [facility]   Service Date: 2024              [facility]  New Visit Note      HISTORY OF PRESENT ILLNESS     The patient's referring provider is: Juan Kaur MD; Stephenie Levine DO    HISTORY OF PRESENT ILLNESS   Libertad Rees is a 64 y.o. female with h/o JUVENCIO, respiratory failure, pulmonary HTN, morbid obesity, HTN, PCOS who presents to a [facility] for a sleep medicine evaluation with concerns of Sleep Apnea.     Past Sleep History  Diagnostic PSG, 6/10/2024-  TST 1.7 hours, no N3/REM  *Performed on 1-2L O2 - home setting is currently 2L; was having desaturations while awake  RDI3%: 73.9  RDI4%: 29.6  O2 edgar: 70%        Current History    On today's visit, the patient reports she is here for evaluation of sleep apnea after recent in lab sleep testing. She had a hospitalization in April for kidney stones and was found to have O2 desaturation and was started on 2L continuous O2. Reports she has desaturations if she is not wearing it with minimal activity/exertion- has home O2 monitor. She has pulmonary follow up in place, Dr. Kendrick next month and recently had PFTs. Also is established with cardiology.    Night time symptoms- snoring, ongoing for 25+ years, rated as moderately loud. Noted to have pauses in her breathing by others. Does report nocturia x2 per night. Denies waking herself to gasping or choking. Does report an occasional night sweat. Endorses dry mouth in the morning.     Denies any symptoms of RLS, dream enactment/sleep related hallucinations or other parasomnia. Denies symptoms of cataplexy.     Sleep schedule:  In bed: 10:30-MN  Subjective sleep latency:  30 min  Awakenings during night:  2- nocturia   Final awakening time:  8AM  Naps: denies    Overall estimate of total sleep time: 7-8  "hour    Preferred sleeping position: side       ESS:  1    REVIEW OF SYSTEMS     REVIEW OF SYSTEMS  See HPI; all other ROS were reviewed and negative for compliant        ALLERGIES AND MEDICATIONS     ALLERGIES  Allergies   Allergen Reactions    Banana Unknown    Kiwi Unknown    Latex Rash       MEDICATIONS  Current Outpatient Medications   Medication Sig Dispense Refill    irbesartan (Avapro) 300 mg tablet TAKE 1 TABLET BY MOUTH EVERY DAY 90 tablet 3    spironolactone (Aldactone) 25 mg tablet Take 1 tablet (25 mg) by mouth once daily. 90 tablet 0    metoprolol succinate XL (Toprol-XL) 50 mg 24 hr tablet Take 1 tablet (50 mg) by mouth once daily. Do not crush or chew. (Patient not taking: Reported on 6/20/2024) 30 tablet 11     No current facility-administered medications for this visit.         PAST HISTORY     PAST MEDICAL HISTORY  She  has a past medical history of Adrenal adenoma, Adverse effect of anesthesia, Hypertension, Lipoma, Nephrolithiasis, and Post-menopausal bleeding (12/27/2023).    PAST SURGICAL HISTORY:  Past Surgical History:   Procedure Laterality Date    LIPOMA RESECTION      LITHOTRIPSY      OTHER SURGICAL HISTORY  12/22/2022    Thoracotomy    SHOULDER SURGERY Right        FAMILY HISTORY  Family History   Problem Relation Name Age of Onset    Hypertension Mother      Stroke Mother      Diabetes Brother      Hypertension Brother         She does have a family history of sleep disorder. Brother has sleep apnea.     SOCIAL HISTORY  She  reports that she has an unknown smoking status. She has never used smokeless tobacco. She reports that she does not currently use alcohol. She reports that she does not use drugs. She    Caffeine consumption: No  Alcohol consumption: No  Marijuana: No      PHYSICAL EXAM     VITAL SIGNS: /83 (BP Location: Left arm, Patient Position: Sitting)   Pulse 90   Ht 1.626 m (5' 4\")   Wt 130 kg (286 lb 2.5 oz)   SpO2 95% Comment: 2 liters oxygen  BMI 49.12 kg/m²  "     CURRENT WEIGHT:   Vitals:    06/20/24 1432   Weight: 130 kg (286 lb 2.5 oz)     Body mass index is 49.12 kg/m².  PREVIOUS WEIGHTS:  Wt Readings from Last 3 Encounters:   06/20/24 130 kg (286 lb 2.5 oz)   06/11/24 137 kg (302 lb 0.5 oz)   04/25/24 137 kg (301 lb)       Constitutional: Alert and oriented, cooperative, no acute distress  Head: Normocephalic, atraumatic   Cranial Features: No abnormal craniofacial features  Neck: Supple. Trachea midline.  Pulmonary: Non-labored breathing, speaks in full sentences.   Cardiac: regular rate   Extremities: No clubbing, no edema  Neuromuscular: Cranial nerves grossly intact, no focal deficits      RESULTS/DATA     Bicarbonate (mmol/L)   Date Value   04/14/2024 36 (H)   04/13/2024 31   04/11/2024 31             ASSESSMENT/PLAN     Ms. Rees is a 64 y.o. female and She was referred to the [facility] for evaluation of JUVENCIO    Problem List, Orders, Assessment, Recommendations:  Problem List Items Addressed This Visit             ICD-10-CM    JUVENCIO (obstructive sleep apnea) - Primary G47.33     -severe on testing; referred after hospitalization where she was having desaturation  -on 2L continuous O2 since hospitalization; sleep study performedon 1-2 L;; due to short sleep time did not get to therapeutic portion (originally ordered as a split night study)  -agreeable to in lab testing for titration; will obtain with TOSCA due to obesity + elevated bicarbonate; will switch to BPAP as necessary  -however to avoid delay in treatment; will order autopap [id number] at this time with 2L bleed in and adjust as necessary after titration study   -Diet, exercise, and weight loss were emphasized today in clinic, as were non-supine sleep, avoiding alcohol in the late evening, and driving or operating heavy machinery when sleepy.         Relevant Orders    Follow Up In Adult Sleep Medicine    Positive Airway Pressure (PAP) Therapy    In-Center Sleep Study (Sleep  Provider Only)    Morbid obesity (Multi) E66.01     -encourage healthy diet/exercise and weight loss   -BMI 49  -bicarb from 4/2024 of 31-36         Acute respiratory failure with hypoxemia (Multi) J96.01     -establishing with pulmonary medicine  -PFTs indicate moderate aiflow obstruction; no significant change with bronchodilator    -most recent bicarb levels from 4/2024 range from 31-36  -BMI 49  -possible OHS   -sleep study will obtain TOSCA monitoring            Disposition    RTC 3 mo with all equipment  -will update after titration study/ make necessary adjustments

## 2024-06-20 NOTE — PATIENT INSTRUCTIONS
[facility] Sleep Medicine  [address] [facility]  [address]   [place] [address]       NAME: Libertad Rees   DATE: 06/20/24    Your Sleep Provider Today: Siomara Holloway PA-C  Your Primary Care Physician: Stephenie Levine DO   Your Referring Provider: Juan Kaur MD    DIAGNOSIS:   1. JUVENCIO (obstructive sleep apnea)  Referral to Adult Sleep Medicine    Positive Airway Pressure (PAP) Therapy    Follow Up In Adult Sleep Medicine      2. Acute respiratory failure with hypoxemia (Multi)  Referral to Adult Sleep Medicine          Thank you for coming to the Sleep Medicine Clinic today! Your sleep medicine provider today was: Siomara Holloway PA-C Below is a summary of your treatment plan, other important information, and our contact numbers:      TREATMENT PLAN     The sleep lab will contact you for the titration study.     FOR QUESTIONS AND CONCERNS:   1. In case of difficulties with PAP device or mask interface, please FIRST contact your DME        (If using [employer]: [phone])  2. For questions concerning SLEEP CLINIC appointments, please call [phone].  3. Regarding SLEEP LAB scheduling, please call [phone] or [phone]    Obstructive Sleep Apnea (JUVENCIO) is a disorder characterized by recurrent apneas during sleep despite efforts to breath. It is due to upper airway obstruction. These respiratory pauses may induce high levels of carbon dioxide or low oxygen levels. Cardiac arrhythmia and elevation of blood pressure in the body and the lungs may occur. Frequent partial arousals occur during a nights sleep resulting in sleep deprivation and daytime sleepiness. It has been associated with an increased risk of cardiovascular disease, stroke, hypertension, and insulin resistance.    RECOMMENDATIONS to help your sleep apnea include: losing weight if overweight, avoidance of sleeping on your back, avoidance of alcohol 2-3 hours before be      As  "we discussed, you have sleep apnea. We will order an CPAP for you which will set you up with your new PAP at home. This will be done by a [employer] ([employer]).    **Bring all equipment with you to follow-up appointments.**     **You will need to be seen day 31-90 days after CPAP set up.**    Insurance expects 4+ hours of use at least 70% of the time.         *Additional Tips*  *You can look at CPAP.com to view different mask styles  - You may be able to swap masks with your PAP vendor within 30 days without being charged for a new mask from the time you receive your PAP device. You should take the advantage of this offer if you have a hard time finding the right mask, as there are many mask options. Please do not get discouraged if you do not like the first one!  - You must clean your PAP device regularly per instructions from your PAP vendor.    *Common Issues and Solutions*  Pillow is dislodging mask - Consider getting a CPAP pillow or switching to a mask with the hose at the top.    Dry Mouth - Adjust Humidity and/or try Biotene Gel.    Leak - Please call your equipment provider (i.e. [employer]) as they may need to adjust your mask.    Difficulty tolerating the PAP mask - Contact your equipment company to try a different mask, we can order a \"mini sleep study\" with the sleep tech to try different masks/settings of pressure.      Additional Resources: American Academy of Sleep Medicine http://sleepeducation.org; or National Sleep Foundation: https://sleepfoundation.org      Follow-up Appointment:   2-3 months       IMPORTANT INFORMATION     Call 911 for medical emergencies.  Our offices are generally open from Monday-Friday, 9 am - 5 pm.  If you need to get in touch with me, you may either call me and my team(number is below) or you can use orderTopia.  If a referral for a test, for CPAP, or for another specialist was made, and you have not heard about scheduling this within a week, " please call scheduling at [phone] ([phone]).  If you are unable to make your appointment for clinic or an overnight study, kindly call the office at least 48 hours in advance to cancel and reschedule.  If you are on CPAP, please bring your device's card or the device to each clinic appointment.   There are no supporting services by either the sleep doctors or their staff on weekends and Holidays, or after 5 PM on weekdays.   If you have been asked to come to a sleep study, make sure you bring toiletries, a comfy pillow, and any nighttime medications that you may regularly take. Also be sure to eat dinner before you arrive. We generally do not provide meals.      PRESCRIPTIONS     We require 7 days advanced notice for prescription refills. If we do not receive the request in this time, we cannot guarantee that your medication will be refilled in time.      IMPORTANT PHONE NUMBERS     Sleep Medicine Clinic Fax: [phone]  Appointments (for Adult Sleep Clinic): [phone] - option 2  Appointments (For Sleep Studies): [phone] - option 3  Behavioral Sleep Medicine: [phone]  Sleep Surgery: [phone]  ENT (Otolaryngology): [phone]  Headache Clinic (Neurology): [phone]  Neurology: [phone]  Psychiatry: [phone]  Pulmonary Function Testing (PFT) Center: [phone]  Pulmonary Medicine: [phone]  [employer] (DME): [phone]  [employer] (DME): [phone]  [facility] ([facility]): [phone]      OUR ADULT SLEEP MEDICINE TEAM   Please do not hesitate to call the office or sleep nurse with any questions between appointments:    Adult Sleep Nurses (Earlene Larios, RN and Kami Manzo RN):  For clinical questions and refilling prescriptions: [phone]  Email sleep diaries and other documents at: [email or web address]    Adult Sleep Medicine Secretaries:  Mirella Montez (For Jose/Shirley/Jewel/Clemente/Mikala/Peter):   P:  [phone]  F: [phone]  Theodora Sutton (For [place]iller): P: [phone]  Fax: [phone]  Edna Hasabhay (For Jurcevic/Blank): P: [phone]  F: [phone]  Beatriz Valenzuelatammie (For Cash): P: [phone]  F: [phone]  Khloe Mathur (For Ana Maria/Earl/Zakhary): P: [phone]  F: [phone]  Nery Murray (For Tiwari/Baez): P: [phone]  F: [phone]     Adult Sleep Medicine Advanced Practice Providers:  Kishor Jim ([place], [place])  Sis Elliott ([facility])  Jillian Saul CNP ([place], [place], [place])  Katerine Holloway CNP ([place], [place], [place])  Annabel Raymundo ([place], [place], [place])  Socorro Baez CNP ([facility])        OUR SLEEP TESTING LOCATIONS     Our team will contact you to schedule your sleep study, however, you can contact us as follow:  Main Phone Line (scheduling only): [phone] ([phone]), option 3  Adult and Pediatric Locations  [facility] (6 years and older): [facility] - 4th floor ([address] [place] [address]) After hours line: [phone]  [facility] (Main campus: All ages): [facility], 6th floor. After hours line: [phone]   [place] (5 years and older; younger considered on case-by-case basis): [address]; [address], [address]. Scheduling  After hours line: [phone]   [place] (6 years and older): [address]; [address]; [address]   [place] (6 years and older): [address] [facility], [address]  After hours line: [phone]   [place] (13 years and older) in [place]: [address], 2nd floor  After hours line: [phone]   [place] (13 year and older): [address] [facility] [address]  After hours line: [phone]     Adult Only Locations:   [place] (18 years and older): [address] [facility], 2nd floor   [place] (18 years and older): [address] [facility]; 4th floor  After hours line:  [phone]   [place] (18 years and older) at [place]: [address] [place] [address]  After hours line: [phone]          CONTACTING YOUR SLEEP MEDICINE PROVIDER     Send a message directly to your provider through \"My Chart\", which is the email service through your  Records Account: [email or web address]   Call [phone] and leave a message. One of the administrative assistants will forward the message to your sleep medicine provider through \"My Chart\" and/or email.     Your sleep medicine provider for this visit was: Siomara Holloway PA-C    "

## 2024-06-21 NOTE — ASSESSMENT & PLAN NOTE
-severe on testing; referred after hospitalization where she was having desaturation  -on 2L continuous O2 since hospitalization; sleep study performedon 1-2 L;; due to short sleep time did not get to therapeutic portion (originally ordered as a split night study)  -agreeable to in lab testing for titration; will obtain with TOSCA due to obesity + elevated bicarbonate; will switch to BPAP as necessary  -however to avoid delay in treatment; will order autopap 5-15cwp at this time with 2L bleed in and adjust as necessary after titration study   -Diet, exercise, and weight loss were emphasized today in clinic, as were non-supine sleep, avoiding alcohol in the late evening, and driving or operating heavy machinery when sleepy.

## 2024-06-21 NOTE — ASSESSMENT & PLAN NOTE
-severe on testing; referred after hospitalization where she was having desaturation  -on 2L continuous O2 since hospitalization; sleep study performedon 1-2 L;; due to short sleep time did not get to therapeutic portion (originally ordered as a split night study)  -agreeable to in lab testing for titration; will obtain with TOSCA due to obesity + elevated bicarbonate; will switch to BPAP as necessary  -Diet, exercise, and weight loss were emphasized today in clinic, as were non-supine sleep, avoiding alcohol in the late evening, and driving or operating heavy machinery when sleepy.

## 2024-06-21 NOTE — ASSESSMENT & PLAN NOTE
-establishing with pulmonary medicine  -PFTs indicate moderate aiflow obstruction; no significant change with bronchodilator    -most recent bicarb levels from 4/2024 range from 31-36  -BMI 49  -possible OHS   -sleep study will obtain TOSCA monitoring

## 2024-06-27 ENCOUNTER — APPOINTMENT (OUTPATIENT)
Dept: CARDIOLOGY | Facility: CLINIC | Age: 64
End: 2024-06-27
Payer: COMMERCIAL

## 2024-06-27 VITALS
WEIGHT: 283.6 LBS | BODY MASS INDEX: 48.42 KG/M2 | SYSTOLIC BLOOD PRESSURE: 128 MMHG | HEIGHT: 64 IN | HEART RATE: 93 BPM | OXYGEN SATURATION: 96 % | DIASTOLIC BLOOD PRESSURE: 68 MMHG

## 2024-06-27 DIAGNOSIS — R09.02 HYPOXEMIA: ICD-10-CM

## 2024-06-27 DIAGNOSIS — E66.01 MORBID OBESITY (MULTI): ICD-10-CM

## 2024-06-27 DIAGNOSIS — R94.31 ABNORMAL EKG: ICD-10-CM

## 2024-06-27 DIAGNOSIS — I10 ESSENTIAL (PRIMARY) HYPERTENSION: ICD-10-CM

## 2024-06-27 DIAGNOSIS — R93.1 ELEVATED CORONARY ARTERY CALCIUM SCORE: ICD-10-CM

## 2024-06-27 DIAGNOSIS — G47.8 NON-RESTORATIVE SLEEP: ICD-10-CM

## 2024-06-27 DIAGNOSIS — J96.01 ACUTE RESPIRATORY FAILURE WITH HYPOXEMIA (MULTI): ICD-10-CM

## 2024-06-27 DIAGNOSIS — I10 HYPERTENSION, ESSENTIAL, BENIGN: Primary | ICD-10-CM

## 2024-06-27 DIAGNOSIS — I27.20 PULMONARY HYPERTENSION (MULTI): ICD-10-CM

## 2024-06-27 DIAGNOSIS — Z87.891 FORMER TOBACCO USE: ICD-10-CM

## 2024-06-27 DIAGNOSIS — R06.09 DOE (DYSPNEA ON EXERTION): ICD-10-CM

## 2024-06-27 DIAGNOSIS — G47.33 OSA (OBSTRUCTIVE SLEEP APNEA): ICD-10-CM

## 2024-06-27 DIAGNOSIS — R06.83 SNORES: ICD-10-CM

## 2024-06-27 PROCEDURE — 99214 OFFICE O/P EST MOD 30 MIN: CPT | Performed by: INTERNAL MEDICINE

## 2024-06-27 PROCEDURE — 3078F DIAST BP <80 MM HG: CPT | Performed by: INTERNAL MEDICINE

## 2024-06-27 PROCEDURE — 3008F BODY MASS INDEX DOCD: CPT | Performed by: INTERNAL MEDICINE

## 2024-06-27 PROCEDURE — 3074F SYST BP LT 130 MM HG: CPT | Performed by: INTERNAL MEDICINE

## 2024-06-27 RX ORDER — IRBESARTAN 300 MG/1
300 TABLET ORAL DAILY
Qty: 90 TABLET | Refills: 3 | Status: SHIPPED | OUTPATIENT
Start: 2024-06-27 | End: 2025-06-27

## 2024-06-27 RX ORDER — SPIRONOLACTONE 25 MG/1
25 TABLET ORAL DAILY
Qty: 90 TABLET | Refills: 3 | Status: SHIPPED | OUTPATIENT
Start: 2024-06-27 | End: 2025-06-27

## 2024-06-27 ASSESSMENT — PAIN SCALES - GENERAL: PAINLEVEL: 0-NO PAIN

## 2024-06-27 NOTE — PATIENT INSTRUCTIONS
Exercise diet weight loss program.    Hydrate    Use My Chart portal for reviewing records, testing and contacting office.     Follow-up with sleep medicine regarding CPAP initiation.

## 2024-06-27 NOTE — PROGRESS NOTES
CARDIOLOGY OFFICE NOTE     Date:   6/27/2024    Patient:    Libertad Rees    YOB: 1960    Primary Physician: Stephenie Levine DO       Reason for Visit: 2-month follow-up.    HPI:     Libertad Rees was seen in cardiac evaluation at the  Cardiology office June 27, 2024.      The patients problems are listed as in the impression below.    Electronic medical records reviewed.    Patient returns.  2-month follow-up.  We had added Toprol-XL at her last visit which she has not started.    She did have testing done which her sleep study was positive for severe obstructive sleep apnea.  Her pulmonary function studies noted moderate restrictive disease.  CT calcium score was positive at low at 4.9.    48-hour Holter monitoring was performed 5/31/2020 average heart rate 84 bpm.  Minimum maximum heart rates of 59 beats minute 26 beats minute.  There were rare isolated PVCs and APCs only.  There is no supraventricular tachycardia or ventricular tachycardia or atrial fibrillation.  No high-grade AV blocks or pauses were noted.    Patient denies Chest Pain, Lightheadedness, Dizziness, TIA or CVA symptoms.  No CHF or Edema.  No Palpitations.  No GI,  or Bleeding Issues. No Recent Fever or Chills.     She has lost 25 pounds.    Cardiovascular and general review of systems is otherwise negative.    A 14-system review is otherwise negative, other than noted.     PHYSICAL EXAMINATION:      Vitals:    06/27/24 0913   BP: 128/68   Pulse: 93   SpO2: 96%     General: No acute distress. Vital signs as noted. Alert and oriented.  On oxygen  Head And Neck Examination: No jugular venous distention, no carotid bruits, no mass. Carotid upstrokes preserved. Oral mucosa moist.  No xanthelasma. Head and neck examination otherwise unremarkable.  Lungs: Clear to auscultation and percussion. No wheezes, no rales,  and no rhonchi.  Chest: Excursion appeared to be normal. No chest wall tenderness on palpation.  Heart: Normal  S1 and S2. No S3. No S4. No rub. Grade 1/6 systolic murmur, best heard at the left sternal border. Point of maximal impulse was within normal limits.  Abdomen: Soft. Nontender. No organomegaly. No bruits. No masses.  Morbidly obese.  Extremities: No bipedal edema. No clubbing. No cyanosis.  Pulses are strong throughout. No bruits.  Musculoskeletal Exam: No ulcers, otherwise unremarkable.  Neuro: Neurologically appeared grossly intact.  .  IMPRESSION:      Shortness of breath  Snoring with daytime somnolence  Palpitations  Hematuria  Hypertension  Abnormal EKG  Elevated CT calcium score, 4.9, 5/2024.  Atrial and ventricular isolated ectopy only by Holter monitor 5/2024.  Cardiomegaly by CT, none reported by echocardiogram 4/2024.  Normal LV systolic function ejection fraction 65 to 70%, echocardiogram 4/2024  LV diastolic dysfunction  Left atrial enlargement  Obstructive sleep apnea, severe, not on CPAP, latest sleep study 5/2024.  Restrictive lung disease , latest PFTs 5/2024.  Negative CTA of the chest for pulmonary embolism, 4/2023  Dilated right main pulmonary artery measuring 3.1 cm, CTA 4/2023  Morbid obesity  History of kidney stones post lithotripsy  Polycystic ovarian disease.  Adrenal adenoma.  Chronic sinusitis, history  Past tobacco use  Otherwise as per assessment below.    RECOMMENDATIONS:      Patient was reassured overall from a cardiovascular standpoint.  Would suggest discontinuing or holding Toprol at this time.  Would suggest evaluation with sleep medicine regarding restrictive lung disease and obstructive sleep apnea with the initiation of CPAP treatment.  Patient was agreeable.    Will continue current medications.  Refills were provided.    Exercise dietary program was encouraged.    Hydration.    [employer] portal use was encouraged.    We will plan to see back in 6 months with Laboratory Studies and ECG as ordered.     Patient will follow up with their primary physician for general care.    The  "patient knows to contact medical care earlier if need be.      ALLERGIES:     Allergies   Allergen Reactions    Banana Unknown    Kiwi Unknown    Latex Rash        MEDICATIONS:     Current Outpatient Medications   Medication Instructions    irbesartan (AVAPRO) 300 mg, oral, Daily    metoprolol succinate XL (TOPROL-XL) 50 mg, oral, Daily, Do not crush or chew.    spironolactone (ALDACTONE) 25 mg, oral, Daily       ELECTROCARDIOGRAM:      None this visit    CARDIAC TESTING:      Sleep study 5/2024.  Obstructive sleep apnea, severe     PFTs 5/2024.  Restrictive lung disease    CT calcium score, 5/2024:  Elevated at 4.9.    Holter monitor, 5/2024:  Predominant sinus rhythm, average heart rate 84 bpm.  Minimum maximum heart rates of 59 beats minute 26 beats minute.  There were rare isolated PVCs and APCs only.  There is no supraventricular tachycardia or ventricular tachycardia or atrial fibrillation.  No high-grade AV blocks or pauses were noted.    LABORATORY DATA:      CBC:   Lab Results   Component Value Date    WBC 7.5 04/14/2024    RBC 4.70 04/14/2024    HGB 13.5 04/14/2024    HCT 44.2 04/14/2024     04/14/2024        CMP:    Lab Results   Component Value Date     04/14/2024    K 3.7 04/14/2024    CL 98 04/14/2024    CO2 36 (H) 04/14/2024    BUN 16 04/14/2024    CREATININE 0.66 04/14/2024    GLUCOSE 128 (H) 04/14/2024    CALCIUM 9.4 04/14/2024       Magnesium:    No results found for: \"MG\"    Lipid Profile:    Lab Results   Component Value Date    CHOL 214 (H) 04/11/2024    TRIG 125 04/11/2024    HDL 44.0 04/11/2024       Hepatic Function Panel:    Lab Results   Component Value Date    ALKPHOS 95 04/13/2024    ALT 12 04/13/2024    AST 16 04/13/2024    PROT 7.5 04/13/2024    BILITOT 0.9 04/13/2024                     PROBLEM LIST:     Patient Active Problem List   Diagnosis    Postmenopausal bleeding    Hypertension, essential, benign    Adrenal adenoma    BMI 50.0-59.9, adult (Multi)    Hypoxemia    " JUVENCIO (obstructive sleep apnea)    Morbid obesity (Multi)    Nephrolithiasis    Acute respiratory failure with hypoxemia (Multi)    Chest wall mass    Mass of right axilla    Polycystic ovaries    Hypoxia    Non-restorative sleep    Pulmonary hypertension (Multi)    HEARD (dyspnea on exertion)    Snores    Abnormal EKG             Justo Sheehan MD, [facility] / [facility] /  Cardiology      Of Note:  [employer]ota voice recognition dictation software was utilized partially in the preparation of this note, therefore, inaccuracies in spelling, word choice and punctuation may have occurred which were not recognized the time of signing.    Patient was seen and examined with total time of visit including chart preparation, rooming, and chart completion exceeding 40 minutes.      ----

## 2024-07-03 ENCOUNTER — HOSPITAL ENCOUNTER (OUTPATIENT)
Dept: RESPIRATORY THERAPY | Facility: HOSPITAL | Age: 64
Discharge: HOME | End: 2024-07-03
Payer: COMMERCIAL

## 2024-07-03 ENCOUNTER — HOSPITAL ENCOUNTER (OUTPATIENT)
Dept: CARDIOLOGY | Facility: HOSPITAL | Age: 64
Discharge: HOME | End: 2024-07-03
Payer: COMMERCIAL

## 2024-07-03 ENCOUNTER — OFFICE VISIT (OUTPATIENT)
Dept: PULMONOLOGY | Facility: HOSPITAL | Age: 64
End: 2024-07-03
Payer: COMMERCIAL

## 2024-07-03 ENCOUNTER — PATIENT OUTREACH (OUTPATIENT)
Dept: CARE COORDINATION | Facility: CLINIC | Age: 64
End: 2024-07-03
Payer: COMMERCIAL

## 2024-07-03 VITALS
SYSTOLIC BLOOD PRESSURE: 127 MMHG | DIASTOLIC BLOOD PRESSURE: 84 MMHG | HEIGHT: 63 IN | HEART RATE: 89 BPM | WEIGHT: 284 LBS | OXYGEN SATURATION: 98 % | BODY MASS INDEX: 50.32 KG/M2

## 2024-07-03 DIAGNOSIS — R09.02 HYPOXEMIA: ICD-10-CM

## 2024-07-03 DIAGNOSIS — G47.8 NON-RESTORATIVE SLEEP: ICD-10-CM

## 2024-07-03 DIAGNOSIS — I27.20 PULMONARY HTN (MULTI): ICD-10-CM

## 2024-07-03 DIAGNOSIS — G47.33 OSA (OBSTRUCTIVE SLEEP APNEA): ICD-10-CM

## 2024-07-03 DIAGNOSIS — R09.02 HYPOXEMIA: Primary | ICD-10-CM

## 2024-07-03 DIAGNOSIS — R06.02 SHORTNESS OF BREATH: ICD-10-CM

## 2024-07-03 DIAGNOSIS — J96.01 ACUTE RESPIRATORY FAILURE WITH HYPOXEMIA (MULTI): ICD-10-CM

## 2024-07-03 LAB
AORTIC VALVE MEAN GRADIENT: 9.1 MMHG
AORTIC VALVE PEAK VELOCITY: 1.99 M/S
AV PEAK GRADIENT: 15.9 MMHG
AVA (PEAK VEL): 2.05 CM2
AVA (VTI): 1.65 CM2
EJECTION FRACTION APICAL 4 CHAMBER: 66.1
EJECTION FRACTION: 65 %
LEFT ATRIUM VOLUME AREA LENGTH INDEX BSA: 25.3 ML/M2
LEFT VENTRICLE INTERNAL DIMENSION DIASTOLE: 4.9 CM (ref 3.5–6)
LEFT VENTRICULAR OUTFLOW TRACT DIAMETER: 1.93 CM
MITRAL VALVE E/A RATIO: 0.83
MITRAL VALVE E/E' RATIO: 10.23
RIGHT VENTRICLE FREE WALL PEAK S': 19 CM/S
TRICUSPID ANNULAR PLANE SYSTOLIC EXCURSION: 2.4 CM

## 2024-07-03 PROCEDURE — 1036F TOBACCO NON-USER: CPT | Performed by: INTERNAL MEDICINE

## 2024-07-03 PROCEDURE — 93306 TTE W/DOPPLER COMPLETE: CPT | Performed by: INTERNAL MEDICINE

## 2024-07-03 PROCEDURE — 3008F BODY MASS INDEX DOCD: CPT | Performed by: INTERNAL MEDICINE

## 2024-07-03 PROCEDURE — 93306 TTE W/DOPPLER COMPLETE: CPT

## 2024-07-03 PROCEDURE — 2500000004 HC RX 250 GENERAL PHARMACY W/ HCPCS (ALT 636 FOR OP/ED): Performed by: INTERNAL MEDICINE

## 2024-07-03 PROCEDURE — 94618 PULMONARY STRESS TESTING: CPT | Performed by: INTERNAL MEDICINE

## 2024-07-03 PROCEDURE — 94618 PULMONARY STRESS TESTING: CPT

## 2024-07-03 PROCEDURE — 3074F SYST BP LT 130 MM HG: CPT | Performed by: INTERNAL MEDICINE

## 2024-07-03 PROCEDURE — 3079F DIAST BP 80-89 MM HG: CPT | Performed by: INTERNAL MEDICINE

## 2024-07-03 PROCEDURE — 99223 1ST HOSP IP/OBS HIGH 75: CPT | Performed by: INTERNAL MEDICINE

## 2024-07-03 ASSESSMENT — PATIENT HEALTH QUESTIONNAIRE - PHQ9
1. LITTLE INTEREST OR PLEASURE IN DOING THINGS: NOT AT ALL
SUM OF ALL RESPONSES TO PHQ9 QUESTIONS 1 AND 2: 0
2. FEELING DOWN, DEPRESSED OR HOPELESS: NOT AT ALL

## 2024-07-03 ASSESSMENT — ENCOUNTER SYMPTOMS
GASTROINTESTINAL NEGATIVE: 1
EYES NEGATIVE: 1
LOSS OF SENSATION IN FEET: 0
ENDOCRINE NEGATIVE: 1
LIGHT-HEADEDNESS: 0
ALLERGIC/IMMUNOLOGIC NEGATIVE: 1
DEPRESSION: 0
OCCASIONAL FEELINGS OF UNSTEADINESS: 0
PSYCHIATRIC NEGATIVE: 1
SHORTNESS OF BREATH: 1
HEMATOLOGIC/LYMPHATIC NEGATIVE: 1
DIZZINESS: 0
MUSCULOSKELETAL NEGATIVE: 1
FATIGUE: 0

## 2024-07-03 ASSESSMENT — PAIN SCALES - GENERAL: PAINLEVEL: 0-NO PAIN

## 2024-07-18 ENCOUNTER — HOSPITAL ENCOUNTER (OUTPATIENT)
Dept: RADIOLOGY | Facility: CLINIC | Age: 64
End: 2024-07-18
Payer: COMMERCIAL

## 2024-07-31 ENCOUNTER — APPOINTMENT (OUTPATIENT)
Dept: PRIMARY CARE | Facility: CLINIC | Age: 64
End: 2024-07-31
Payer: COMMERCIAL

## 2024-07-31 VITALS
OXYGEN SATURATION: 98 % | RESPIRATION RATE: 16 BRPM | WEIGHT: 279 LBS | BODY MASS INDEX: 49.43 KG/M2 | TEMPERATURE: 97.9 F | DIASTOLIC BLOOD PRESSURE: 78 MMHG | SYSTOLIC BLOOD PRESSURE: 120 MMHG | HEART RATE: 80 BPM | HEIGHT: 63 IN

## 2024-07-31 DIAGNOSIS — G47.33 OSA (OBSTRUCTIVE SLEEP APNEA): Primary | ICD-10-CM

## 2024-07-31 PROCEDURE — 1036F TOBACCO NON-USER: CPT | Performed by: FAMILY MEDICINE

## 2024-07-31 PROCEDURE — 3078F DIAST BP <80 MM HG: CPT | Performed by: FAMILY MEDICINE

## 2024-07-31 PROCEDURE — 99213 OFFICE O/P EST LOW 20 MIN: CPT | Performed by: FAMILY MEDICINE

## 2024-07-31 PROCEDURE — 3008F BODY MASS INDEX DOCD: CPT | Performed by: FAMILY MEDICINE

## 2024-07-31 PROCEDURE — 3074F SYST BP LT 130 MM HG: CPT | Performed by: FAMILY MEDICINE

## 2024-07-31 NOTE — PROGRESS NOTES
"Subjective     Patient ID: Libertad Rees is a 64 y.o. female who presents for Sleep Apnea (Using CPAP and O2).  HPI    Review of Systems    Objective     Vitals:    07/31/24 1630   BP: 120/78   BP Location: Left arm   Patient Position: Sitting   Pulse: 80   Resp: 16   Temp: 36.6 °C (97.9 °F)   SpO2: 98%   Weight: 127 kg (279 lb)   Height: 1.6 m (5' 3\")        Current Outpatient Medications   Medication Instructions   • irbesartan (AVAPRO) 300 mg, oral, Daily   • spironolactone (ALDACTONE) 25 mg, oral, Daily        Physical Exam    Assessment/Plan   {Assess/PlanSmartLinks:46941}                       "

## 2024-08-01 NOTE — PROGRESS NOTES
"Subjective   Libertad Rees is a 64 y.o. female who presents today in follow-up for management of sleep apnea. She is currently using CPAP.  She is feeling well-rested and reports sleeping 7 hours per night.    CPAP compliance: excellent.  Apnea symptoms: well controlled.    Objective   /78 (BP Location: Left arm, Patient Position: Sitting)   Pulse 80   Temp 36.6 °C (97.9 °F)   Resp 16   Ht 1.6 m (5' 3\")   Wt 127 kg (279 lb)   SpO2 98% Comment: 2l  BMI 49.42 kg/m²   Wt Readings from Last 3 Encounters:   07/31/24 127 kg (279 lb)   07/03/24 129 kg (284 lb)   06/27/24 129 kg (283 lb 9.6 oz)       General appearance: alert and oriented, in no acute distress  Ears: normal TM's and external ear canals both ears  Nose: Nares normal. Septum midline. Mucosa normal. No drainage or sinus tenderness.  Throat: lips, mucosa, and tongue normal; teeth and gums normal  Neck: no adenopathy, no carotid bruit, no JVD, supple, symmetrical, trachea midline, and thyroid not enlarged, symmetric, no tenderness/mass/nodules  Lungs: clear to auscultation bilaterally  Heart: regular rate and rhythm, S1, S2 normal, no murmur, click, rub or gallop  Extremities: extremities normal, warm and well-perfused; no cyanosis, clubbing, or edema    Assessment/Plan   Diagnoses and all orders for this visit:  JUVENCIO (obstructive sleep apnea)      Continue present management.  Follow-up as directed in patient instructions.  "

## 2024-08-02 ENCOUNTER — HOSPITAL ENCOUNTER (OUTPATIENT)
Dept: RADIOLOGY | Facility: CLINIC | Age: 64
Discharge: HOME | End: 2024-08-02
Payer: COMMERCIAL

## 2024-08-02 VITALS — HEIGHT: 63 IN | BODY MASS INDEX: 49.61 KG/M2 | WEIGHT: 279.98 LBS

## 2024-08-02 DIAGNOSIS — Z12.31 ENCOUNTER FOR SCREENING MAMMOGRAM FOR BREAST CANCER: ICD-10-CM

## 2024-08-02 PROCEDURE — 77067 SCR MAMMO BI INCL CAD: CPT

## 2024-08-05 DIAGNOSIS — R93.1 ELEVATED CORONARY ARTERY CALCIUM SCORE: ICD-10-CM

## 2024-08-05 DIAGNOSIS — R06.83 SNORES: ICD-10-CM

## 2024-08-05 DIAGNOSIS — G47.8 NON-RESTORATIVE SLEEP: ICD-10-CM

## 2024-08-05 DIAGNOSIS — I10 ESSENTIAL (PRIMARY) HYPERTENSION: ICD-10-CM

## 2024-08-05 DIAGNOSIS — I10 HYPERTENSION, ESSENTIAL, BENIGN: ICD-10-CM

## 2024-08-05 DIAGNOSIS — Z87.891 FORMER TOBACCO USE: ICD-10-CM

## 2024-08-05 DIAGNOSIS — I27.20 PULMONARY HYPERTENSION (MULTI): ICD-10-CM

## 2024-08-05 DIAGNOSIS — G47.33 OSA (OBSTRUCTIVE SLEEP APNEA): ICD-10-CM

## 2024-08-05 DIAGNOSIS — R09.02 HYPOXEMIA: ICD-10-CM

## 2024-08-05 DIAGNOSIS — E66.01 MORBID OBESITY (MULTI): ICD-10-CM

## 2024-08-05 DIAGNOSIS — R06.09 DOE (DYSPNEA ON EXERTION): ICD-10-CM

## 2024-08-05 DIAGNOSIS — J96.01 ACUTE RESPIRATORY FAILURE WITH HYPOXEMIA (MULTI): ICD-10-CM

## 2024-08-05 DIAGNOSIS — R94.31 ABNORMAL EKG: ICD-10-CM

## 2024-08-05 RX ORDER — SPIRONOLACTONE 25 MG/1
25 TABLET ORAL DAILY
Qty: 90 TABLET | Refills: 3 | Status: SHIPPED | OUTPATIENT
Start: 2024-08-05 | End: 2025-08-05

## 2024-08-14 ENCOUNTER — HOSPITAL ENCOUNTER (OUTPATIENT)
Dept: RADIOLOGY | Facility: HOSPITAL | Age: 64
Discharge: HOME | End: 2024-08-14
Payer: COMMERCIAL

## 2024-08-14 VITALS — HEIGHT: 63 IN | BODY MASS INDEX: 49.26 KG/M2 | WEIGHT: 278 LBS

## 2024-08-14 DIAGNOSIS — R92.8 ABNORMAL MAMMOGRAM: ICD-10-CM

## 2024-08-14 PROCEDURE — 77061 BREAST TOMOSYNTHESIS UNI: CPT | Mod: LEFT SIDE | Performed by: RADIOLOGY

## 2024-08-14 PROCEDURE — 76981 USE PARENCHYMA: CPT | Mod: LT

## 2024-08-14 PROCEDURE — 77065 DX MAMMO INCL CAD UNI: CPT | Mod: LEFT SIDE | Performed by: RADIOLOGY

## 2024-08-14 PROCEDURE — 77061 BREAST TOMOSYNTHESIS UNI: CPT | Mod: LT

## 2024-08-14 PROCEDURE — 76642 ULTRASOUND BREAST LIMITED: CPT | Mod: LT

## 2024-08-14 PROCEDURE — 76642 ULTRASOUND BREAST LIMITED: CPT | Mod: LEFT SIDE | Performed by: RADIOLOGY

## 2024-08-27 ENCOUNTER — APPOINTMENT (OUTPATIENT)
Dept: SLEEP MEDICINE | Facility: CLINIC | Age: 64
End: 2024-08-27
Payer: COMMERCIAL

## 2024-09-30 ENCOUNTER — APPOINTMENT (OUTPATIENT)
Dept: PRIMARY CARE | Facility: CLINIC | Age: 64
End: 2024-09-30
Payer: COMMERCIAL

## 2024-09-30 VITALS
RESPIRATION RATE: 18 BRPM | HEART RATE: 80 BPM | OXYGEN SATURATION: 94 % | BODY MASS INDEX: 48.9 KG/M2 | TEMPERATURE: 97.8 F | SYSTOLIC BLOOD PRESSURE: 122 MMHG | WEIGHT: 276 LBS | DIASTOLIC BLOOD PRESSURE: 82 MMHG | HEIGHT: 63 IN

## 2024-09-30 DIAGNOSIS — M54.50 CHRONIC BILATERAL LOW BACK PAIN WITHOUT SCIATICA: ICD-10-CM

## 2024-09-30 DIAGNOSIS — G47.33 OSA (OBSTRUCTIVE SLEEP APNEA): Primary | ICD-10-CM

## 2024-09-30 DIAGNOSIS — G89.29 CHRONIC BILATERAL LOW BACK PAIN WITHOUT SCIATICA: ICD-10-CM

## 2024-09-30 DIAGNOSIS — R53.81 PHYSICAL DEBILITY: ICD-10-CM

## 2024-09-30 PROCEDURE — 99213 OFFICE O/P EST LOW 20 MIN: CPT | Performed by: FAMILY MEDICINE

## 2024-09-30 PROCEDURE — 3079F DIAST BP 80-89 MM HG: CPT | Performed by: FAMILY MEDICINE

## 2024-09-30 PROCEDURE — 3074F SYST BP LT 130 MM HG: CPT | Performed by: FAMILY MEDICINE

## 2024-09-30 PROCEDURE — 3008F BODY MASS INDEX DOCD: CPT | Performed by: FAMILY MEDICINE

## 2024-09-30 NOTE — PROGRESS NOTES
"Subjective     Patient ID: Libertad Rees is a 64 y.o. female who presents for Sleep Apnea.  HPI Is using the Cpap consistently now for a few months. She is getting good restorative sleep. Has an appointment with sleep medicine coming up and will do another sleep study.  She is still using oxygen during the day. She can go a few minutes without it and while she is seated. It is bled into the oxygen.  The oxygen she carries is too heavy. She is having trouble walking and getting back and neck pain.    Review of Systems   Constitutional:  Positive for fatigue.   HENT: Negative.     Eyes: Negative.    Respiratory: Negative.     Cardiovascular: Negative.    Gastrointestinal: Negative.    Endocrine: Negative.    Genitourinary: Negative.    Skin: Negative.    Neurological: Negative.    Psychiatric/Behavioral: Negative.         Objective     Vitals:    09/30/24 1339   BP: 122/82   BP Location: Right arm   Patient Position: Sitting   Pulse: 80   Resp: 18   Temp: 36.6 °C (97.8 °F)   TempSrc: Temporal   SpO2: 94%   Weight: 125 kg (276 lb)   Height: 1.6 m (5' 3\")        Current Outpatient Medications   Medication Instructions    irbesartan (AVAPRO) 300 mg, oral, Daily    spironolactone (ALDACTONE) 25 mg, oral, Daily        Physical Exam  Constitutional:       Appearance: Normal appearance.   HENT:      Head: Normocephalic and atraumatic.      Right Ear: Tympanic membrane normal.      Left Ear: Tympanic membrane normal.      Nose: Nose normal.      Mouth/Throat:      Mouth: Mucous membranes are moist.   Cardiovascular:      Rate and Rhythm: Normal rate and regular rhythm.   Pulmonary:      Effort: Pulmonary effort is normal.      Breath sounds: Normal breath sounds.   Skin:     General: Skin is warm and dry.   Neurological:      Mental Status: She is alert.         Assessment/Plan   Diagnoses and all orders for this visit:  JUVENCIO (obstructive sleep apnea)  Chronic bilateral low back pain without sciatica  Physical debility  -    "  Disability Placard

## 2024-10-01 ASSESSMENT — ENCOUNTER SYMPTOMS
GASTROINTESTINAL NEGATIVE: 1
PSYCHIATRIC NEGATIVE: 1
CARDIOVASCULAR NEGATIVE: 1
NEUROLOGICAL NEGATIVE: 1
EYES NEGATIVE: 1
RESPIRATORY NEGATIVE: 1
FATIGUE: 1
ENDOCRINE NEGATIVE: 1

## 2024-10-08 ENCOUNTER — OFFICE VISIT (OUTPATIENT)
Dept: SLEEP MEDICINE | Facility: CLINIC | Age: 64
End: 2024-10-08
Payer: COMMERCIAL

## 2024-10-08 VITALS
SYSTOLIC BLOOD PRESSURE: 154 MMHG | TEMPERATURE: 98.4 F | DIASTOLIC BLOOD PRESSURE: 90 MMHG | OXYGEN SATURATION: 99 % | HEIGHT: 63 IN | BODY MASS INDEX: 49.43 KG/M2 | HEART RATE: 86 BPM | WEIGHT: 279 LBS

## 2024-10-08 DIAGNOSIS — G47.33 OSA (OBSTRUCTIVE SLEEP APNEA): Primary | ICD-10-CM

## 2024-10-08 DIAGNOSIS — E66.01 MORBID OBESITY (MULTI): ICD-10-CM

## 2024-10-08 DIAGNOSIS — J96.01 ACUTE RESPIRATORY FAILURE WITH HYPOXEMIA (MULTI): ICD-10-CM

## 2024-10-08 PROCEDURE — 3008F BODY MASS INDEX DOCD: CPT | Performed by: PHYSICIAN ASSISTANT

## 2024-10-08 PROCEDURE — 3080F DIAST BP >= 90 MM HG: CPT | Performed by: PHYSICIAN ASSISTANT

## 2024-10-08 PROCEDURE — 99214 OFFICE O/P EST MOD 30 MIN: CPT | Performed by: PHYSICIAN ASSISTANT

## 2024-10-08 PROCEDURE — 3077F SYST BP >= 140 MM HG: CPT | Performed by: PHYSICIAN ASSISTANT

## 2024-10-08 ASSESSMENT — COLUMBIA-SUICIDE SEVERITY RATING SCALE - C-SSRS
1. IN THE PAST MONTH, HAVE YOU WISHED YOU WERE DEAD OR WISHED YOU COULD GO TO SLEEP AND NOT WAKE UP?: NO
2. HAVE YOU ACTUALLY HAD ANY THOUGHTS OF KILLING YOURSELF?: NO
6. HAVE YOU EVER DONE ANYTHING, STARTED TO DO ANYTHING, OR PREPARED TO DO ANYTHING TO END YOUR LIFE?: NO

## 2024-10-08 ASSESSMENT — PATIENT HEALTH QUESTIONNAIRE - PHQ9
SUM OF ALL RESPONSES TO PHQ9 QUESTIONS 1 AND 2: 0
1. LITTLE INTEREST OR PLEASURE IN DOING THINGS: NOT AT ALL
2. FEELING DOWN, DEPRESSED OR HOPELESS: NOT AT ALL

## 2024-10-08 ASSESSMENT — ENCOUNTER SYMPTOMS
OCCASIONAL FEELINGS OF UNSTEADINESS: 0
DEPRESSION: 0
LOSS OF SENSATION IN FEET: 0

## 2024-10-08 ASSESSMENT — PAIN SCALES - GENERAL: PAINLEVEL: 0-NO PAIN

## 2024-10-08 NOTE — PATIENT INSTRUCTIONS
[facility] Sleep Medicine  [facility] [address] [address]  [address] [facility] [address]       NAME: Libertad Rees   DATE: 10/08/24    Your Sleep Provider Today: Siomara Holloway PA-C  Your Primary Care Physician: Stephenie Levine, DO   Your Referring Provider: No ref. provider found    DIAGNOSIS:   1. JUVENCIO (obstructive sleep apnea)            Thank you for coming to the Sleep Medicine Clinic today! Your sleep medicine provider today was: Siomara Holloway PA-C Below is a summary of your treatment plan, other important information, and our contact numbers:      TREATMENT PLAN   Titration study- will be with cpap without oxygen and the oxygen will be added as needed  If we decide to cancel this, let me know; we would order overnight oximetry series to see what your oxygen does on 2L and without oxygen      RTC 6 months     COMPLIANCE: 70% of nights for a minimum of 4 hours (the night will not count toward compliance if you do not meet the 4 hour javi); ideally CPAP should be worn for all sleep periods         IMPORTANT INFORMATION     Call 911 for medical emergencies.  Our offices are generally open from Monday-Friday, 9 am - 5 pm.  If you need to get in touch with me, you may either call me and my team(number is below) or you can use [employer].  If a referral for a test, for CPAP, or for another specialist was made, and you have not heard about scheduling this within a week, please call scheduling at [phone]).  If you are unable to make your appointment for clinic or an overnight study, kindly call the office at least 48 hours in advance to cancel and reschedule.  If you are on CPAP, please bring your device's card or the device to each clinic appointment.   There are no supporting services by either the sleep doctors or their staff on weekends and Holidays, or after 5 PM on weekdays.   If you have been asked to come to a sleep study, make sure you bring toiletries, a  comfy pillow, and any nighttime medications that you may regularly take. Also be sure to eat dinner before you arrive. We generally do not provide meals.      PRESCRIPTIONS     We require 7 days advanced notice for prescription refills. If we do not receive the request in this time, we cannot guarantee that your medication will be refilled in time.      IMPORTANT PHONE NUMBERS     Sleep Medicine Clinic Fax: [phone]  Appointments (for Adult Sleep Clinic): [phone] - option 2  Appointments (For Sleep Studies): [phone] - option 3  [facility] (DME): [phone]  Behavioral Sleep Medicine: [phone]  Sleep Surgery: [phone]  ENT (Otolaryngology): [phone]  Headache Clinic (Neurology): [phone]  Neurology: [phone]  Psychiatry: [phone]  Pulmonary Function Testing (PFT) Center: [phone]  Pulmonary Medicine: [phone]    [facility] (DME): [phone]  [employer] (DME): [phone]  [facility] [phone]      OUR ADULT SLEEP MEDICINE TEAM   Please do not hesitate to call the office or sleep nurse with any questions between appointments:    Adult Sleep Nurses (Earlene Larios, RN and Danii Avalos RN):  For clinical questions and refilling prescriptions: [phone]  Email sleep diaries and other documents at: [email or web address]    Adult Sleep Medicine Secretaries:  Mirella Montez (For Jose/Watson/Krise/Strohl/Yeh/Green):   P: [phone]  F: [phone]  Theodora Sutton (For [place]genbiller): P: [phone]  Fax: [phone]  Edna Cat (For Jurcevic/Blank): P: [phone]  F: [phone]  Beatriz Garcia (For Breanne): P: [phone]  F: [phone]  Khloe Mathur (For Ana Maria/Earl/Tha): P: [phone]  F: [phone]  Nery Murray (For Te/Nestor): P: [phone]  F: [phone]     Adult Sleep Medicine Advanced Practice Providers:  Kishor JimFairbanks,  "[place])  Sis Elliott ([facility])  Jillian Saul CNP ([place], [place], [place])  Katerine Holloway CNP ([place], [place], [place])  Socorro Baez CNP ([place], [place])        OUR SLEEP TESTING LOCATIONS     Our team will contact you to schedule your sleep study, however, you can contact us as follow:  Main Phone Line (scheduling only): [phone]), option 3  Adult and Pediatric Locations   [place] (6 years and older): [facility] - 4th floor ([address] [facility]) After hours line: [phone]  [facility] (Main campus: All ages): [facility], 6th floor. After hours line: [phone]   [place] (5 years and older; younger considered on case-by-case basis): [address]; [address], [address]. Scheduling  After hours line: [phone]   [place] (6 years and older): [address]; [address]; [address]   [place] (6 years and older): [address] [facility], [address]  After hours line: [phone]   [place] (13 years and older) in [place]: [address], 2nd floor  After hours line: [phone]  [facility] (13 year and older): [address]  After hours line: [phone]     Adult Only Locations:   [place] (18 years and older): [address] [facility], 2nd floor   [place] (18 years and older): [address] [facility]; 4th floor  After hours line: [phone]  [facility] (18 years and older) at [place]: [id number] [place] [address]  After hours line: [phone]          CONTACTING YOUR SLEEP MEDICINE PROVIDER     Send a message directly to your provider through \"My Chart\", which is the email service through your  Records Account: [email or web address]   Call [phone] and leave a message. One of the administrative assistants will forward the message to your sleep medicine provider through \"My Chart\" and/or email.     Your sleep medicine " provider for this visit was: Siomara Holloway PA-C

## 2024-10-08 NOTE — PROGRESS NOTES
Patient: Libertad Rees    [id number]  : 1960 -- AGE 64 y.o.    Provider: Siomara Holloway PA-C     Location [facility] mymission2 [facility] 1   Service Date: 10/9/2024              [facility]  Followup Visit Note    HISTORY OF PRESENT ILLNESS     HISTORY OF PRESENT ILLNESS   Libertad Rees is a 64 y.o. female with h/o JUVENCIO, respiratory failure, pulmonary HTN, morbid obesity, HTN, PCOS  who presents to a [facility] Sleep Medicine Clinic for followup.     Past Sleep History  Diagnostic PSG, 6/10/2024-  TST 1.7 hours, no N3/REM  *Performed on 1-2L O2 - home setting is currently 2L; was having desaturations while awake  RDI3%: 73.9  RDI4%: 29.6  O2 edgar: 70%    Assessment and plan from last visit: 2024--     JUVENCIO (obstructive sleep apnea) - Primary G47.33        -severe on testing; referred after hospitalization where she was having desaturation  -on 2L continuous O2 since hospitalization; sleep study performedon 1-2 L;; due to short sleep time did not get to therapeutic portion (originally ordered as a split night study)  -agreeable to in lab testing for titration; will obtain with TOSCA due to obesity + elevated bicarbonate; will switch to BPAP as necessary  -however to avoid delay in treatment; will order autopap 5-15cwp at this time with 2L bleed in and adjust as necessary after titration study   -Diet, exercise, and weight loss were emphasized today in clinic, as were non-supine sleep, avoiding alcohol in the late evening, and driving or operating heavy machinery when sleepy.           Relevant Orders     Follow Up In Adult Sleep Medicine     Positive Airway Pressure (PAP) Therapy     In-Center Sleep Study (Sleep Provider Only)     Morbid obesity (Multi) E66.01       -encourage healthy diet/exercise and weight loss   -BMI 49  -bicarb from 2024 of 31-36           Acute respiratory failure with hypoxemia (Multi) J96.01       -establishing with pulmonary  medicine  -PFTs indicate moderate aiflow obstruction; no significant change with bronchodilator     -most recent bicarb levels from 4/2024 range from 31-36  -BMI 49  -possible OHS   -sleep study will obtain TOSCA monitoring             Current History    On today's visit, the patient reports she is doing very well on cpap at this time. Does state it took her about a month or so to really feel benefit from it. States she does feel more energy during the day. Does not believe snoring with pap on. Using nasal pillows, which work well for her. Continues 2L O2 bleed in overnight. States over the past week or so has been able to wean O2 during the day, keeps an overnight oximetry monitor with her and not dipping below 90%. Overall feeling good.       Sleep schedule:  In bed: 10:30-MN  Subjective sleep latency:  30 min, quicker with cpap   Awakenings during night:  2- nocturia   Final awakening time:  8AM  Naps: denies     Overall estimate of total sleep time: 7-8 hour      ESS:  1  HEMAL:  2  FOSQ: 40    REVIEW OF SYSTEMS     REVIEW OF SYSTEMS  See HPI; all other ROS were reviewed and negative for compliant      ALLERGIES AND MEDICATIONS     ALLERGIES  Allergies   Allergen Reactions    Banana Unknown    Kiwi Unknown    Latex Rash       MEDICATIONS: She has a current medication list which includes the following prescription(s): irbesartan - Take 1 tablet (300 mg) by mouth once daily and spironolactone - Take 1 tablet (25 mg) by mouth once daily.    PAST MEDICAL HISTORY : She  has a past medical history of Adrenal adenoma, Adverse effect of anesthesia, Hypertension, Lipoma, Nephrolithiasis, and Post-menopausal bleeding (12/27/2023).    PAST SURGICAL HISTORY: She  has a past surgical history that includes Other surgical history (12/22/2022); Lithotripsy; Shoulder surgery (Right); and Lipoma resection.     FAMILY HISTORY: No changes since previous visit. Otherwise non-contributory as charted.     SOCIAL HISTORY  She  reports  "that she has quit smoking. Her smoking use included cigarettes. She has never used smokeless tobacco. She reports that she does not currently use alcohol. She reports that she does not use drugs.       PHYSICAL EXAM     VITAL SIGNS: /90   Pulse 86   Temp 36.9 °C (98.4 °F)   Ht 1.6 m (5' 3\")   Wt 127 kg (279 lb)   SpO2 99%   BMI 49.42 kg/m²        PREVIOUS WEIGHTS:  Wt Readings from Last 3 Encounters:   10/08/24 127 kg (279 lb)   09/30/24 125 kg (276 lb)   08/14/24 126 kg (278 lb)       Constitutional: Alert and oriented, cooperative, no acute distress  Head: Normocephalic, atraumatic   Cranial Features: No abnormal craniofacial features  Neck: Supple. Trachea midline.  Pulmonary: Non-labored breathing, speaks in full sentences. No cough.    Cardiac: regular rate   Extremities: No clubbing, no edema  Neuromuscular: Cranial nerves grossly intact, no focal deficits      RESULTS/DATA     Bicarbonate (mmol/L)   Date Value   04/14/2024 36 (H)   04/13/2024 31   04/11/2024 31       PAP Adherence  A PAP adherence download was obtained and data was reviewed personally today in clinic.        ASSESSMENT/PLAN     Ms. Rees is a 64 y.o. female and she returns in followup to the [facility] for JUVENCIO.    Problem List, Orders, Assessment, Recommendations:  Problem List Items Addressed This Visit             ICD-10-CM    JUVENCIO (obstructive sleep apnea) - Primary G47.33     -severe on testing; referred after hospitalization where she was having desaturation  -on 2L continuous O2 since hospitalization; sleep study performedon 1-2 L;; due to short sleep time did not get to therapeutic portion (originally ordered as a split night study)  -scheduled for in lab titration;  with TOSCA due to obesity + elevated bicarbonate; will switch to BPAP as necessary --> still recommend this to see if she needs to O2 and if hypercapneic (however she states at times it can be difficult to stay away due to  " with Parkinson's disease, will let me know ASAP if she does not think she can do this and would then proceed with overnight oximetry).  -however to avoid delay in treatment started on cpap therapy and JUVENCIO well controlled  -Diet, exercise, and weight loss were emphasized today in clinic, as were non-supine sleep, avoiding alcohol in the late evening, and driving or operating heavy machinery when sleepy.         Relevant Orders    Positive Airway Pressure (PAP) Therapy    Morbid obesity (Multi) E66.01     -encourage healthy diet/exercise and weight loss   -BMI 49  -bicarb from 4/2024 of 31-36         Acute respiratory failure with hypoxemia (Multi) J96.01     -seen by pulmonary medicine -->recently weaning off daytime O2; has continued on bleed in nocturnal O2 for now  -PFTs indicate moderate aiflow obstruction; no significant change with bronchodilator    -most recent bicarb levels from 4/2024 range from 31-36  -BMI 49  -possible OHS   -sleep study will obtain TOSCA monitoring              Disposition    Return to clinic in 6 months  -contact with results; will make adjustments as needed to therapy

## 2024-10-09 NOTE — ASSESSMENT & PLAN NOTE
-severe on testing; referred after hospitalization where she was having desaturation  -on 2L continuous O2 since hospitalization; sleep study performedon 1-2 L;; due to short sleep time did not get to therapeutic portion (originally ordered as a split night study)  -scheduled for in lab titration;  with TOSCA due to obesity + elevated bicarbonate; will switch to BPAP as necessary --> still recommend this to see if she needs to O2 and if hypercapneic (however she states at times it can be difficult to stay away due to  with Parkinson's disease, will let me know ASAP if she does not think she can do this and would then proceed with overnight oximetry).  -however to avoid delay in treatment started on cpap therapy and JUVENCIO well controlled  -Diet, exercise, and weight loss were emphasized today in clinic, as were non-supine sleep, avoiding alcohol in the late evening, and driving or operating heavy machinery when sleepy.

## 2024-10-09 NOTE — ASSESSMENT & PLAN NOTE
-seen by pulmonary medicine -->recently weaning off daytime O2; has continued on bleed in nocturnal O2 for now  -PFTs indicate moderate aiflow obstruction; no significant change with bronchodilator    -most recent bicarb levels from 4/2024 range from 31-36  -BMI 49  -possible OHS   -sleep study will obtain TOSCA monitoring

## 2024-10-18 ENCOUNTER — CLINICAL SUPPORT (OUTPATIENT)
Dept: SLEEP MEDICINE | Facility: CLINIC | Age: 64
End: 2024-10-18
Payer: COMMERCIAL

## 2024-10-18 DIAGNOSIS — G47.33 OSA (OBSTRUCTIVE SLEEP APNEA): ICD-10-CM

## 2024-10-19 VITALS
DIASTOLIC BLOOD PRESSURE: 96 MMHG | BODY MASS INDEX: 49.61 KG/M2 | SYSTOLIC BLOOD PRESSURE: 147 MMHG | HEIGHT: 63 IN | WEIGHT: 279.98 LBS

## 2024-10-19 ASSESSMENT — SLEEP AND FATIGUE QUESTIONNAIRES
HOW LIKELY ARE YOU TO NOD OFF OR FALL ASLEEP WHILE SITTING QUIETLY AFTER LUNCH WITHOUT ALCOHOL: WOULD NEVER DOZE
HOW LIKELY ARE YOU TO NOD OFF OR FALL ASLEEP WHILE SITTING AND READING: WOULD NEVER DOZE
HOW LIKELY ARE YOU TO NOD OFF OR FALL ASLEEP WHILE WATCHING TV: SLIGHT CHANCE OF DOZING
HOW LIKELY ARE YOU TO NOD OFF OR FALL ASLEEP WHEN YOU ARE A PASSENGER IN A CAR FOR AN HOUR WITHOUT A BREAK: WOULD NEVER DOZE
SITING INACTIVE IN A PUBLIC PLACE LIKE A CLASS ROOM OR A MOVIE THEATER: WOULD NEVER DOZE
HOW LIKELY ARE YOU TO NOD OFF OR FALL ASLEEP IN A CAR, WHILE STOPPED FOR A FEW MINUTES IN TRAFFIC: WOULD NEVER DOZE
HOW LIKELY ARE YOU TO NOD OFF OR FALL ASLEEP WHILE SITTING AND TALKING TO SOMEONE: WOULD NEVER DOZE
ESS-CHAD TOTAL SCORE: 2
HOW LIKELY ARE YOU TO NOD OFF OR FALL ASLEEP WHILE LYING DOWN TO REST IN THE AFTERNOON WHEN CIRCUMSTANCES PERMIT: SLIGHT CHANCE OF DOZING

## 2024-10-19 NOTE — PROGRESS NOTES
[facility] TECH NOTE:     Patient: Libertad Rees   MRN//AGE: [id number]  1960  64 y.o.   Technologist: Zuleyka Lepe   Room: 3   Service Date: 10/18/2024        Sleep Testing Location: [facility]: 2    TECHNOLOGIST SLEEP STUDY PROCEDURE NOTE:   This sleep study is being conducted according to the policies and procedures outlined by the AAS accreditation standards.  The sleep study procedure and processes involved during this appointment was explained to the patient/patient’s family, questions were answered. The patient/family verbalized understanding.      The patient is a 64 y.o. year old female scheduled for a CPAP titration with montage of:  CPAP Titration . she arrived for her appointment.      The study that was ultimately completed was aBPAP titration with montage of:  BIPAP Titration .    The full study Was completed.  Patient questionnaires completed?: yes     Consents signed? yes    Initial Fall Risk Screening:     Libertad has not fallen in the last 6 months. her did not result in injury. Libertad does not have a fear of falling. He does not need assistance with sitting, standing, or walking. she does not need assistance walking in her home. she does not need assistance in an unfamiliar setting. The patient is notusing an assistive device.     Brief Study observations: All sensors applied. TcCO2 monitoring per order. Full study completed.     Deviation to order/protocol and reason: None     If PAP, which was preferred mask/pressure/mode: Resmed Airfit P10 Medium nasal pillow mask. Titration initiated with CPAP, modality changed to BIPAP per order for elevated CO2.     Other:None    After the procedure, the patient/family was informed to ensure followup with ordering clinician for testing results.      Technologist: RANDI Arciniega

## 2024-11-01 ENCOUNTER — TELEPHONE (OUTPATIENT)
Dept: SLEEP MEDICINE | Facility: CLINIC | Age: 64
End: 2024-11-01
Payer: COMMERCIAL

## 2024-11-01 DIAGNOSIS — R06.89 HYPOVENTILATION: ICD-10-CM

## 2024-11-01 DIAGNOSIS — G47.33 OSA (OBSTRUCTIVE SLEEP APNEA): Primary | ICD-10-CM

## 2025-01-02 ENCOUNTER — APPOINTMENT (OUTPATIENT)
Dept: CARDIOLOGY | Facility: CLINIC | Age: 65
End: 2025-01-02
Payer: COMMERCIAL

## 2025-01-09 ENCOUNTER — APPOINTMENT (OUTPATIENT)
Dept: CARDIOLOGY | Facility: CLINIC | Age: 65
End: 2025-01-09
Payer: COMMERCIAL

## 2025-01-14 ENCOUNTER — OFFICE VISIT (OUTPATIENT)
Dept: SLEEP MEDICINE | Facility: CLINIC | Age: 65
End: 2025-01-14
Payer: COMMERCIAL

## 2025-01-14 VITALS
TEMPERATURE: 98 F | OXYGEN SATURATION: 95 % | SYSTOLIC BLOOD PRESSURE: 156 MMHG | HEIGHT: 63 IN | WEIGHT: 283.13 LBS | HEART RATE: 85 BPM | RESPIRATION RATE: 16 BRPM | DIASTOLIC BLOOD PRESSURE: 81 MMHG | BODY MASS INDEX: 50.17 KG/M2

## 2025-01-14 DIAGNOSIS — J96.01 ACUTE RESPIRATORY FAILURE WITH HYPOXEMIA (MULTI): ICD-10-CM

## 2025-01-14 DIAGNOSIS — E66.01 MORBID OBESITY (MULTI): ICD-10-CM

## 2025-01-14 DIAGNOSIS — G47.33 OSA (OBSTRUCTIVE SLEEP APNEA): Primary | ICD-10-CM

## 2025-01-14 PROCEDURE — 99214 OFFICE O/P EST MOD 30 MIN: CPT | Performed by: PHYSICIAN ASSISTANT

## 2025-01-14 PROCEDURE — 1036F TOBACCO NON-USER: CPT | Performed by: PHYSICIAN ASSISTANT

## 2025-01-14 PROCEDURE — 3008F BODY MASS INDEX DOCD: CPT | Performed by: PHYSICIAN ASSISTANT

## 2025-01-14 PROCEDURE — 3079F DIAST BP 80-89 MM HG: CPT | Performed by: PHYSICIAN ASSISTANT

## 2025-01-14 PROCEDURE — 3077F SYST BP >= 140 MM HG: CPT | Performed by: PHYSICIAN ASSISTANT

## 2025-01-14 ASSESSMENT — ENCOUNTER SYMPTOMS
DEPRESSION: 0
OCCASIONAL FEELINGS OF UNSTEADINESS: 0
LOSS OF SENSATION IN FEET: 0

## 2025-01-14 ASSESSMENT — PAIN SCALES - GENERAL: PAINLEVEL_OUTOF10: 0-NO PAIN

## 2025-01-14 NOTE — ASSESSMENT & PLAN NOTE
-BMI 50 today  -declines nutrition/wt mgmt referral - going to first try on her own with tracking  -biarb form 4/2024 is 31  -ABG was ordered prior to titration study -> declined; will monitor on future BMP- order in system from pcp

## 2025-01-14 NOTE — ASSESSMENT & PLAN NOTE
-severe on testing; referred after hospitalization where she was having desaturation  -on 2L continuous O2 since hospitalization; sleep study performedon 1-2 L;; due to short sleep time did not get to therapeutic portion (originally ordered as a split night study) --> titration study shows CO2 retention, improved with BPAP and O2 consistently > 90% on BPAP 14/10  - doing well, meeting compliance at this time, rAHI 0.1  -reiterated to her that bleed in O2 to bpap circuit is not necessary and would not advise to do this as O2 saturations with BPAP already 90% and above with average 94-95%   -Diet, exercise, and weight loss were emphasized today in clinic, as were non-supine sleep, avoiding alcohol in the late evening, and driving or operating heavy machinery when sleepy.

## 2025-01-14 NOTE — PROGRESS NOTES
Patient: Libertad Rees    [id number]  : 1960 -- AGE 64 y.o.    Provider: Siomara Holloway PA-C     Location [facility]   Service Date: 2025              [facility]  Followup Visit Note    HISTORY OF PRESENT ILLNESS     HISTORY OF PRESENT ILLNESS   Libertad Rees is a 64 y.o. female with h/o JUVENCIO, respiratory failure, HTN, morbid obesity who presents to a [facility] for followup.       Past Sleep History  Diagnostic PSG, 6/10/2024-  TST 1.7 hours, no N3/REM  *Performed on 1-2L O2 - home setting is currently 2L; was having desaturations while awake  RDI3%: 73.9  RDI4%: 29.6  O2 edgar: 70%    Titration study  -hypoventilation present, improved with BPAP, does not suggest additional O2 needed   -at BPAP 14/10- RDI of 15. And O2 edgar of 90%  -recommendations for Auto BPAP: Max IPAP 14, Min EPAP 6, PS 5       Assessment and plan from last visit: 10/9/2024--     JUVENCIO (obstructive sleep apnea) - Primary G47.33        -severe on testing; referred after hospitalization where she was having desaturation  -on 2L continuous O2 since hospitalization; sleep study performedon 1-2 L;; due to short sleep time did not get to therapeutic portion (originally ordered as a split night study)  -scheduled for in lab titration;  with TOSCA due to obesity + elevated bicarbonate; will switch to BPAP as necessary --> still recommend this to see if she needs to O2 and if hypercapneic (however she states at times it can be difficult to stay away due to  with Parkinson's disease, will let me know ASAP if she does not think she can do this and would then proceed with overnight oximetry).  -however to avoid delay in treatment started on cpap therapy and JUVENCIO well controlled  -Diet, exercise, and weight loss were emphasized today in clinic, as were non-supine sleep, avoiding alcohol in the late evening, and driving or operating heavy machinery  when sleepy.           Relevant Orders     Positive Airway Pressure (PAP) Therapy     Morbid obesity (Multi) E66.01       -encourage healthy diet/exercise and weight loss   -BMI 49  -bicarb from 4/2024 of 31-36           Acute respiratory failure with hypoxemia (Multi) J96.01       -seen by pulmonary medicine -->recently weaning off daytime O2; has continued on bleed in nocturnal O2 for now  -PFTs indicate moderate aiflow obstruction; no significant change with bronchodilator     -most recent bicarb levels from 4/2024 range from 31-36  -BMI 49  -possible OHS   -sleep study will obtain TOSCA monitoring                 Current History    On today's visit, the patient reports received new pap device. Doing very well on therapy,acclimated OK. Continues to experience good benefit, even more than cpap. Not napping/drowsy during the day. Has a lot of energy. Using nasal mask.   Did lose some weight, fell off with husbands birthday and the holidays. Going to get back to tracking. Declines [facility] weight management/nutrition referrals.     Sleep schedule:  In bed: 10:30-MN  Subjective sleep latency:  30 min, quicker with cpap   Awakenings during night:  2- nocturia   Final awakening time:  8AM  Naps: denies     Overall estimate of total sleep time: 7-8 hour    ESS:  0  HEMAL:  2    REVIEW OF SYSTEMS     REVIEW OF SYSTEMS  See HPI; all other ROS were reviewed and negative for compliant      ALLERGIES AND MEDICATIONS     ALLERGIES  Allergies   Allergen Reactions    Banana Unknown    Kiwi Unknown    Latex Rash       MEDICATIONS: She has a current medication list which includes the following prescription(s): irbesartan - Take 1 tablet (300 mg) by mouth once daily and spironolactone - Take 1 tablet (25 mg) by mouth once daily.    PAST MEDICAL HISTORY : She  has a past medical history of Adrenal adenoma, Adverse effect of anesthesia, Hypertension, Lipoma, Nephrolithiasis, and Post-menopausal bleeding (12/27/2023).    PAST SURGICAL  HISTORY: She  has a past surgical history that includes Other surgical history (12/22/2022); Lithotripsy; Shoulder surgery (Right); and Lipoma resection.     FAMILY HISTORY: No changes since previous visit. Otherwise non-contributory as charted.     SOCIAL HISTORY  She  reports that she has quit smoking. Her smoking use included cigarettes. She has never used smokeless tobacco. She reports that she does not currently use alcohol. She reports that she does not use drugs.       PHYSICAL EXAM     VITAL SIGNS: There were no vitals taken for this visit.       PREVIOUS WEIGHTS:  Wt Readings from Last 3 Encounters:   10/19/24 127 kg (279 lb 15.8 oz)   10/08/24 127 kg (279 lb)   09/30/24 125 kg (276 lb)       Constitutional: Alert and oriented, cooperative, no acute distress  Head: Normocephalic, atraumatic   Cranial Features: No abnormal craniofacial features  Neck: Supple. Trachea midline.  Pulmonary: Non-labored breathing, speaks in full sentences. No cough.    Cardiac: regular rate   Extremities: No clubbing, no edema  Neuromuscular: Cranial nerves grossly intact, no focal deficits      RESULTS/DATA     Bicarbonate (mmol/L)   Date Value   04/14/2024 36 (H)   04/13/2024 31   04/11/2024 31       PAP Adherence  A PAP adherence download was obtained and data was reviewed personally today in clinic.          ASSESSMENT/PLAN     Ms. Rees is a 64 y.o. female and she returns in followup to the [facility] for JUVENCIO.    Problem List, Orders, Assessment, Recommendations:  Problem List Items Addressed This Visit             ICD-10-CM    JUVENCIO (obstructive sleep apnea) - Primary G47.33     -severe on testing; referred after hospitalization where she was having desaturation  -on 2L continuous O2 since hospitalization; sleep study performedon 1-2 L;; due to short sleep time did not get to therapeutic portion (originally ordered as a split night study) --> titration study shows CO2 retention, improved with  BPAP and O2 consistently > 90% on BPAP 14/10  - doing well, meeting compliance at this time, Audrey 0.1  -reiterated to her that bleed in O2 to bpap circuit is not necessary and would not advise to do this as O2 saturations with BPAP already 90% and above with average 94-95%   -Diet, exercise, and weight loss were emphasized today in clinic, as were non-supine sleep, avoiding alcohol in the late evening, and driving or operating heavy machinery when sleepy.         Relevant Orders    Positive Airway Pressure (PAP) Therapy    Morbid obesity (Multi) E66.01     -BMI 50 today  -declines nutrition/wt mgmt referral - going to first try on her own with tracking  -biarb form 4/2024 is 31  -ABG was ordered prior to titration study -> declined; will monitor on future BMP- order in system from pcp          Acute respiratory failure with hypoxemia (Multi) J96.01       Disposition    Return to clinic in 6 months

## 2025-01-14 NOTE — ASSESSMENT & PLAN NOTE
-OHS, elevated CO2 on sleep study, improved with bpap therapy   -seen by pulmonary medicine -->recently weaning off daytime O2-- to follow their protocol for daytime O2  -OK to dc bleed in nocturnal O2 to pap circuit   -PFTs indicate moderate aiflow obstruction; no significant change with bronchodilator    -most recent bicarb levels from 4/2024 range from 31-36  -BMI 41

## 2025-02-10 ENCOUNTER — APPOINTMENT (OUTPATIENT)
Dept: PULMONOLOGY | Facility: CLINIC | Age: 65
End: 2025-02-10
Payer: COMMERCIAL

## 2025-02-10 VITALS
OXYGEN SATURATION: 96 % | RESPIRATION RATE: 18 BRPM | HEIGHT: 63 IN | HEART RATE: 84 BPM | DIASTOLIC BLOOD PRESSURE: 74 MMHG | BODY MASS INDEX: 50 KG/M2 | SYSTOLIC BLOOD PRESSURE: 114 MMHG | WEIGHT: 282.2 LBS

## 2025-02-10 DIAGNOSIS — G47.33 OSA (OBSTRUCTIVE SLEEP APNEA): ICD-10-CM

## 2025-02-10 PROCEDURE — 3078F DIAST BP <80 MM HG: CPT | Performed by: INTERNAL MEDICINE

## 2025-02-10 PROCEDURE — 3074F SYST BP LT 130 MM HG: CPT | Performed by: INTERNAL MEDICINE

## 2025-02-10 PROCEDURE — 3008F BODY MASS INDEX DOCD: CPT | Performed by: INTERNAL MEDICINE

## 2025-02-10 PROCEDURE — 99203 OFFICE O/P NEW LOW 30 MIN: CPT | Performed by: INTERNAL MEDICINE

## 2025-02-10 ASSESSMENT — ENCOUNTER SYMPTOMS
LOSS OF SENSATION IN FEET: 0
DEPRESSION: 0
OCCASIONAL FEELINGS OF UNSTEADINESS: 0

## 2025-03-02 NOTE — PROGRESS NOTES
Department of Medicine  Division of Pulmonary, Critical Care, and Sleep Medicine  Consultation  [facility] - [address], [address]    Physician HPI:  Mrs. Rees is a 65-year-old woman with past medical history of severe obstructive sleep apnea on auto BiPAP, hypertension and nephrolithiasis who presented to the office today to establish care.  The patient was admitted to the hospital in April 2024 for nephrolithiasis where she was noted to have possible pulmonary hypertension on chest imaging.  She was referred to pulmonary hypertension clinic and seen by Dr. Kendrick. Her RV was mildly enlarged on echocardiogram though RV systolic pressure is within normal range.  The patient was found to have severe obstructive sleep apnea at that time on a diagnostic PSG. She was started on auto BiPAP and has been following up with sleep clinic at [facility] since then.  She denies acute respiratory symptoms today.  She has history of thoracotomy within 5 for a fluid filled nodule in the right chest that was found to be benign. Records from thoracic surgery is not available to review today.     Review of Systems   All other review of systems are negative and/or non-contributory.      Immunizations:  Immunization History   Administered Date(s) Administered    COVID-19, mRNA, LNP-S, PF, 30 mcg/0.3 mL dose 04/23/2021, 05/14/2021, 12/20/2021    Influenza, Unspecified 12/22/2022    Influenza, seasonal, injectable 10/28/2020    Tdap vaccine, age 7 year and older (BOOSTRIX, ADACEL) 06/01/2015       Past Medical History:  Past Medical History:   Diagnosis Date    Adrenal adenoma     Adverse effect of anesthesia     low oxygen levels postop    Hypertension     Lipoma     Nephrolithiasis     Post-menopausal bleeding 12/27/2023       Past Surgical History:  Past Surgical History:   Procedure Laterality Date    LIPOMA RESECTION      LITHOTRIPSY      OTHER SURGICAL HISTORY  12/22/2022    Thoracotomy    SHOULDER SURGERY Right        Family  "History:  Family History   Problem Relation Name Age of Onset    Hypertension Mother      Stroke Mother      Diabetes Brother      Hypertension Brother         Social History:  Social History     Tobacco Use    Smoking status: Former     Types: Cigarettes    Smokeless tobacco: Never   Vaping Use    Vaping status: Never Used   Substance Use Topics    Alcohol use: Not Currently    Drug use: Never        Occupational & Environmental History:   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   Constitutional:       General: She is not in acute distress.     Appearance: Normal appearance. She is obese.   HENT:      Head: Normocephalic and atraumatic.      Mouth/Throat:      Mouth: Mucous membranes are moist.   Eyes:      Conjunctiva/sclera: Conjunctivae normal.   Cardiovascular:      Rate and Rhythm: Normal rate.   Pulmonary:      Effort: Pulmonary effort is normal. No respiratory distress.      Breath sounds: Normal breath sounds. No stridor. No wheezing or rhonchi.   Skin:     Coloration: Skin is not jaundiced.   Neurological:      General: No focal deficit present.      Mental Status: She is alert and oriented to person, place, and time. Mental status is at baseline.   Psychiatric:         Mood and Affect: Mood normal.         Behavior: Behavior normal.         Judgment: Judgment normal.          Pulmonary " Function Test Results       Chest Radiograph     No results found for this or any previous visit from the past 2000 days.      Echocardiogram     No results found for this or any previous visit from the past 365 days.       Chest CT Scan     CT angio chest for pulmonary embolism 04/13/2024    Narrative  Interpreted By:  Henri Medina,  STUDY:  CT ANGIO CHEST FOR PULMONARY EMBOLISM;  4/13/2024 12:21 pm    INDICATION:  Signs/Symptoms:Hypoxia with ambulation.    COMPARISON:  None    ACCESSION NUMBER(S):  [id number]    ORDERING CLINICIAN:  PATRICE PAEZ    TECHNIQUE:  Helical data acquisition of the chest was obtained after intravenous  administration of 60 mL Omnipaque as per PE protocol. Images were  reformatted in coronal and sagittal planes. Axial and coronal maximum  intensity projection (MIP) images were created and reviewed.    FINDINGS:  POTENTIAL LIMITATIONS OF THE STUDY: Suboptimal    HEART AND VESSELS:  The pulmonary trunk and main pulmonary arteries are well opacify with  no definite filling defects. The segmental and subsegmental branches  are poorly opacified and difficult to evaluate.    Dilated right main pulmonary artery measuring 3.1 cm.  No coronary artery calcifications are seen. Please note, the study is  not optimized for evaluation of coronary arteries.    Heart:  Cardiomegaly. No pericardial effusion.    MEDIASTINUM AND STEPHY, LOWER NECK AND AXILLA:  The visualized thyroid gland is within normal limits.  No evidence of thoracic lymphadenopathy by CT criteria.  Esophagus appears within normal limits as seen.    LUNGS AND AIRWAYS:  Scattered bilateral atelectatic bands. No major pulmonary  consolidation, pleural effusion or pneumothorax. Central airways are  patent.    UPPER ABDOMEN:  Left adrenal nodule measuring 2.6 cm with Hounsfield unit consistent  with adenoma.    CHEST WALL AND OSSEOUS STRUCTURES:  Chest wall is within normal limits.  No acute osseous pathology.    Impression  1. No  evidence of acute central/major pulmonary embolism up to the  level of the 1st order segmental branches. The subsegmental and  peripheral branches are not well opacified and difficult to evaluate.  2. Scattered bilateral atelectatic bands.. No major pulmonary  consolidation, pleural effusion or pneumothorax.  3. Cardiomegaly.  4. Dilated right main pulmonary artery measuring 3.1 cm which could  be related to underlying pulmonary hypertension.  5. Heterogenous thyroid gland with limited evaluation given overlying  streak artifact from the IV contrast. This could be further assessed  by dedicated nonemergent thyroid ultrasound if clinically desired.        MACRO:  None    Signed by: Henri Medina 4/13/2024 1:05 PM  Dictation workstation:   [id number]       Laboratory Studies     Lab Results   Component Value Date    WBC 7.5 04/14/2024    HGB 13.5 04/14/2024    HCT 44.2 04/14/2024    MCV 94 04/14/2024     04/14/2024      Lab Results   Component Value Date    GLUCOSE 128 (H) 04/14/2024    CALCIUM 9.4 04/14/2024     04/14/2024    K 3.7 04/14/2024    CO2 36 (H) 04/14/2024    CL 98 04/14/2024    BUN 16 04/14/2024    CREATININE 0.66 04/14/2024      Lab Results   Component Value Date    ALT 12 04/13/2024    AST 16 04/13/2024    ALKPHOS 95 04/13/2024    BILITOT 0.9 04/13/2024          Assessment and Plan / Recommendations     JUVENCIO - severe: on auto BIPAP with excellent compliance. Reports to feel better with therapy. Following with sleep clinic.   Suspected pulmonary hypertension on Chest CT - Libertad is reassured that no further work up needed at this time. RVSP is within normal range on Echocardiogram. Weight loss and JUVENCIO management is recommended.    Please excuse any misspellings or unintended errors related to the Dragon speech recognition software used to dictate this note.    Gilberto Shanks MD  02/10/2025

## 2025-04-03 ENCOUNTER — OFFICE VISIT (OUTPATIENT)
Facility: CLINIC | Age: 65
End: 2025-04-03
Payer: COMMERCIAL

## 2025-04-03 VITALS
RESPIRATION RATE: 18 BRPM | TEMPERATURE: 98.3 F | BODY MASS INDEX: 50.32 KG/M2 | WEIGHT: 284 LBS | HEART RATE: 99 BPM | HEIGHT: 63 IN | DIASTOLIC BLOOD PRESSURE: 79 MMHG | SYSTOLIC BLOOD PRESSURE: 128 MMHG | OXYGEN SATURATION: 97 %

## 2025-04-03 DIAGNOSIS — Z13.6 SCREENING FOR CARDIOVASCULAR CONDITION: ICD-10-CM

## 2025-04-03 DIAGNOSIS — R00.2 PALPITATIONS: Primary | ICD-10-CM

## 2025-04-03 PROCEDURE — 3078F DIAST BP <80 MM HG: CPT | Performed by: FAMILY MEDICINE

## 2025-04-03 PROCEDURE — 93000 ELECTROCARDIOGRAM COMPLETE: CPT | Performed by: FAMILY MEDICINE

## 2025-04-03 PROCEDURE — 99214 OFFICE O/P EST MOD 30 MIN: CPT | Performed by: FAMILY MEDICINE

## 2025-04-03 PROCEDURE — 3008F BODY MASS INDEX DOCD: CPT | Performed by: FAMILY MEDICINE

## 2025-04-03 PROCEDURE — 1036F TOBACCO NON-USER: CPT | Performed by: FAMILY MEDICINE

## 2025-04-03 PROCEDURE — 3074F SYST BP LT 130 MM HG: CPT | Performed by: FAMILY MEDICINE

## 2025-04-03 ASSESSMENT — ENCOUNTER SYMPTOMS: PALPITATIONS: 1

## 2025-04-03 ASSESSMENT — PATIENT HEALTH QUESTIONNAIRE - PHQ9
1. LITTLE INTEREST OR PLEASURE IN DOING THINGS: NOT AT ALL
2. FEELING DOWN, DEPRESSED OR HOPELESS: NOT AT ALL
SUM OF ALL RESPONSES TO PHQ9 QUESTIONS 1 AND 2: 0

## 2025-04-03 NOTE — PROGRESS NOTES
"Subjective     Patient ID: Libertad Rees is a 65 y.o. female who presents for Rapid Heart Rate.  Patient reports tachycardia/palpitations began about 10 days ago. Only once has an episode of the rapid heart rate lasted longer than a minute. Per patient today's episode lasted roughly 3 minutes. Prior to the episode, patient states she was walking up stairs and working around the house. Patient states nothing aggravates her heart rate, but has been using breathing exercises to help.     Palpitations   This is a new problem. The current episode started 1 to 4 weeks ago. The problem occurs intermittently. The problem has been gradually worsening. On average, each episode lasts 1 minute. Nothing aggravates the symptoms. She has tried breathing exercises for the symptoms. The treatment provided significant relief. Risk factors include obesity.       Review of Systems   Cardiovascular:  Positive for palpitations.       Objective     Vitals:    04/03/25 1333   BP: 128/79   BP Location: Left arm   Patient Position: Sitting   Pulse: 99   Resp: 18   Temp: 36.8 °C (98.3 °F)   TempSrc: Temporal   SpO2: 97%   Weight: 129 kg (284 lb)   Height: 1.6 m (5' 3\")        Current Outpatient Medications   Medication Instructions    irbesartan (AVAPRO) 300 mg, oral, Daily    spironolactone (ALDACTONE) 25 mg, oral, Daily        Physical Exam  Constitutional:       Appearance: Normal appearance.   HENT:      Head: Normocephalic and atraumatic.      Right Ear: Tympanic membrane normal.      Left Ear: Tympanic membrane normal.      Nose: Nose normal.      Mouth/Throat:      Mouth: Mucous membranes are moist.   Cardiovascular:      Rate and Rhythm: Normal rate and regular rhythm.   Pulmonary:      Effort: Pulmonary effort is normal.      Breath sounds: Normal breath sounds.   Neurological:      Mental Status: She is alert.         Lab Results   Component Value Date    WBC 7.5 04/14/2024    RBC 4.70 04/14/2024    HGB 13.5 04/14/2024    HCT 44.2 " 04/14/2024    MCV 94 04/14/2024    MCH 28.7 04/14/2024    MCHC 30.5 (L) 04/14/2024     04/14/2024     Lab Results   Component Value Date    GLUCOSE 128 (H) 04/14/2024     04/14/2024    K 3.7 04/14/2024    CL 98 04/14/2024    CO2 36 (H) 04/14/2024    ANIONGAP 11 04/14/2024    BUN 16 04/14/2024    CREATININE 0.66 04/14/2024    CALCIUM 9.4 04/14/2024    ALBUMIN 3.9 04/13/2024    ALKPHOS 95 04/13/2024    PROT 7.5 04/13/2024    AST 16 04/13/2024    BILITOT 0.9 04/13/2024    ALT 12 04/13/2024      Lab Results   Component Value Date    CHOL 214 (H) 04/11/2024    HDL 44.0 04/11/2024    CHHDL 4.9 04/11/2024    VLDL 25 04/11/2024    TRIG 125 04/11/2024           Assessment/Plan   Assessment & Plan  Palpitations    Orders:    CBC; Future    Comprehensive Metabolic Panel; Future    Lipid Panel; Future    Thyroid Stimulating Hormone; Future    Magnesium; Future    Screening for cardiovascular condition    Orders:    ECG 12 Lead         I will order labs today. She will schedule a follow-up with Justo Peters.               n/a

## 2025-04-21 ENCOUNTER — TELEPHONE (OUTPATIENT)
Facility: CLINIC | Age: 65
End: 2025-04-21

## 2025-04-21 NOTE — TELEPHONE ENCOUNTER
Received document from [facility].    Stating they need an order for discontinuation of oxygen if applicable.    Then they can schedule .    Please advise.

## 2025-04-22 ENCOUNTER — APPOINTMENT (OUTPATIENT)
Dept: SLEEP MEDICINE | Facility: CLINIC | Age: 65
End: 2025-04-22
Payer: COMMERCIAL

## 2025-05-01 LAB
ALBUMIN SERPL-MCNC: 4.4 G/DL (ref 3.6–5.1)
ALP SERPL-CCNC: 104 U/L (ref 37–153)
ALT SERPL-CCNC: 12 U/L (ref 6–29)
ANION GAP SERPL CALCULATED.4IONS-SCNC: 11 MMOL/L (CALC) (ref 7–17)
AST SERPL-CCNC: 12 U/L (ref 10–35)
BILIRUB SERPL-MCNC: 1.7 MG/DL (ref 0.2–1.2)
BUN SERPL-MCNC: 18 MG/DL (ref 7–25)
CALCIUM SERPL-MCNC: 10 MG/DL (ref 8.6–10.4)
CHLORIDE SERPL-SCNC: 103 MMOL/L (ref 98–110)
CHOLEST SERPL-MCNC: 175 MG/DL
CHOLEST/HDLC SERPL: 4.4 (CALC)
CO2 SERPL-SCNC: 24 MMOL/L (ref 20–32)
CREAT SERPL-MCNC: 0.54 MG/DL (ref 0.5–1.05)
EGFRCR SERPLBLD CKD-EPI 2021: 103 ML/MIN/1.73M2
ERYTHROCYTE [DISTWIDTH] IN BLOOD BY AUTOMATED COUNT: 12.7 % (ref 11–15)
GLUCOSE SERPL-MCNC: 111 MG/DL (ref 65–99)
HCT VFR BLD AUTO: 41.9 % (ref 35–45)
HDLC SERPL-MCNC: 40 MG/DL
HGB BLD-MCNC: 14.1 G/DL (ref 11.7–15.5)
LDLC SERPL CALC-MCNC: 109 MG/DL (CALC)
MAGNESIUM SERPL-MCNC: 2.2 MG/DL (ref 1.5–2.5)
MCH RBC QN AUTO: 30 PG (ref 27–33)
MCHC RBC AUTO-ENTMCNC: 33.7 G/DL (ref 32–36)
MCV RBC AUTO: 89.1 FL (ref 80–100)
NONHDLC SERPL-MCNC: 135 MG/DL (CALC)
PLATELET # BLD AUTO: 321 THOUSAND/UL (ref 140–400)
PMV BLD REES-ECKER: 10.2 FL (ref 7.5–12.5)
POTASSIUM SERPL-SCNC: 4.1 MMOL/L (ref 3.5–5.3)
PROT SERPL-MCNC: 7.5 G/DL (ref 6.1–8.1)
RBC # BLD AUTO: 4.7 MILLION/UL (ref 3.8–5.1)
SODIUM SERPL-SCNC: 138 MMOL/L (ref 135–146)
TRIGL SERPL-MCNC: 149 MG/DL
TSH SERPL-ACNC: 1.56 MIU/L (ref 0.4–4.5)
WBC # BLD AUTO: 7.7 THOUSAND/UL (ref 3.8–10.8)

## 2025-05-07 ENCOUNTER — APPOINTMENT (OUTPATIENT)
Dept: CARDIOLOGY | Facility: CLINIC | Age: 65
End: 2025-05-07
Payer: COMMERCIAL

## 2025-05-07 VITALS
SYSTOLIC BLOOD PRESSURE: 140 MMHG | DIASTOLIC BLOOD PRESSURE: 70 MMHG | HEIGHT: 63 IN | BODY MASS INDEX: 49.72 KG/M2 | HEART RATE: 92 BPM | WEIGHT: 280.6 LBS

## 2025-05-07 DIAGNOSIS — R94.31 ABNORMAL EKG: ICD-10-CM

## 2025-05-07 DIAGNOSIS — G47.33 OSA (OBSTRUCTIVE SLEEP APNEA): ICD-10-CM

## 2025-05-07 DIAGNOSIS — R93.1 ELEVATED CORONARY ARTERY CALCIUM SCORE: ICD-10-CM

## 2025-05-07 DIAGNOSIS — I10 HYPERTENSION, ESSENTIAL, BENIGN: ICD-10-CM

## 2025-05-07 DIAGNOSIS — E66.01 MORBID OBESITY (MULTI): ICD-10-CM

## 2025-05-07 DIAGNOSIS — Z87.891 FORMER TOBACCO USE: ICD-10-CM

## 2025-05-07 DIAGNOSIS — R06.09 DOE (DYSPNEA ON EXERTION): ICD-10-CM

## 2025-05-07 DIAGNOSIS — J96.01 ACUTE RESPIRATORY FAILURE WITH HYPOXEMIA: ICD-10-CM

## 2025-05-07 DIAGNOSIS — R00.2 PALPITATIONS: Primary | ICD-10-CM

## 2025-05-07 DIAGNOSIS — I27.20 PULMONARY HYPERTENSION (MULTI): ICD-10-CM

## 2025-05-07 DIAGNOSIS — R06.83 SNORES: ICD-10-CM

## 2025-05-07 DIAGNOSIS — I10 ESSENTIAL (PRIMARY) HYPERTENSION: ICD-10-CM

## 2025-05-07 DIAGNOSIS — G47.8 NON-RESTORATIVE SLEEP: ICD-10-CM

## 2025-05-07 DIAGNOSIS — R09.02 HYPOXEMIA: ICD-10-CM

## 2025-05-07 PROCEDURE — 3078F DIAST BP <80 MM HG: CPT | Performed by: INTERNAL MEDICINE

## 2025-05-07 PROCEDURE — 3077F SYST BP >= 140 MM HG: CPT | Performed by: INTERNAL MEDICINE

## 2025-05-07 PROCEDURE — 3008F BODY MASS INDEX DOCD: CPT | Performed by: INTERNAL MEDICINE

## 2025-05-07 PROCEDURE — 99214 OFFICE O/P EST MOD 30 MIN: CPT | Performed by: INTERNAL MEDICINE

## 2025-05-07 RX ORDER — METOPROLOL SUCCINATE 25 MG/1
25 TABLET, EXTENDED RELEASE ORAL DAILY
Qty: 90 TABLET | Refills: 3 | Status: SHIPPED | OUTPATIENT
Start: 2025-05-07 | End: 2026-05-02

## 2025-05-07 RX ORDER — IRBESARTAN 300 MG/1
300 TABLET ORAL DAILY
Qty: 90 TABLET | Refills: 3 | Status: SHIPPED | OUTPATIENT
Start: 2025-05-07 | End: 2026-05-07

## 2025-05-07 RX ORDER — SPIRONOLACTONE 25 MG/1
25 TABLET ORAL DAILY
Qty: 90 TABLET | Refills: 3 | Status: SHIPPED | OUTPATIENT
Start: 2025-05-07 | End: 2026-05-07

## 2025-05-07 NOTE — PATIENT INSTRUCTIONS
Follow up 3 months    Start Metoprolol Succinate 25 mg 1 tablet daily    Swimming is a great way to get some aerobic exercise  DID YOU KNOW  We have a pharmacy here in the [facility].  They can fill all prescriptions, not just cardiac medications.  Prescriptions from other pharmacies can easily be transferred to the  pharmacy by the  pharmacist on site.   pharmacies offer FREE HOME DELIVERY on medications to anywhere in [place]. They can sync your medications. Typically prescriptions can be ready in 10 - 15 minutes. If pharmacy is unable to fill your  prescription or if cost is more than your paying now the Pharmacist can easily transfer back to your Pharmacy of choice. Pharmacy phone # [phone].     Please bring all medicines, vitamins, and herbal supplements with you in original bottles to every appointment!!!!    Prescriptions will not be filled unless you are compliant with your follow up appointments or have a follow up appointment scheduled as per instruction of your physician. Refills should be requested at the time of your visit.

## 2025-05-07 NOTE — PROGRESS NOTES
CARDIOLOGY OFFICE NOTE     Date:   5/7/2025    Patient:    Libertad Rees    YOB: 1960    Primary Physician: Stephenie Levine DO         REASON FOR VISIT / CHIEF COMPLAINT:     1 year cardiology follow-up.    HPI:     Libertad Rees was seen in cardiac evaluation at the [facility] Cardiology office May 7, 2025.      The patients problems are listed as in the impression below.    Electronic medical records reviewed.    Patient returns.  She states she feels well.  She is remodeling painting.  She is on BiPAP now and feels much more active and more energized.    She has noted palpitations at times if she is active.    Her blood pressure slightly elevated today.    Patient denies Chest Pain, SOB, Lightheadedness, Dizziness, TIA or CVA symptoms.  No CHF or Edema.  No GI,  or Bleeding Issues. No Recent Fever or Chills.     Cardiovascular and general review of systems is otherwise negative.    A 14-system review is otherwise negative, other than noted.     PHYSICAL EXAMINATION:      Vitals:    05/07/25 1338   BP: 140/70   Pulse: 92       General: No acute distress. Alert and oriented.   Head And Neck Examination: No jugular venous distention, no carotid bruits, no mass. Carotid upstrokes preserved. Oral mucosa moist.  No xanthelasma. Head and neck examination otherwise unremarkable.  Lungs: Clear to auscultation and percussion. No wheezes, no rales,  and no rhonchi.  Chest: Excursion appeared to be normal. No chest wall tenderness on palpation.  Heart: Normal S1 and S2. No S3. No S4. No rub. Grade 1/6 systolic murmur, best heard at the left sternal border. Point of maximal impulse was within normal limits.  Abdomen: Soft. Nontender. No organomegaly. No bruits. No masses.  Morbidly obese.  Extremities: No bipedal edema. No clubbing. No cyanosis.  Pulses are strong throughout. No bruits.  Musculoskeletal Exam: No ulcers, otherwise unremarkable.  Neuro: Neurologically appeared grossly  intact.  .  IMPRESSION:         Cardiovascular status stable  Palpitations  Hypertension  Abnormal EKG  Elevated CT calcium score, 4.9, 5/2024.  Atrial and ventricular isolated ectopy only by Holter monitor 5/2024.  Cardiomegaly by CT, none reported by echocardiogram 4/2024.  Normal LV systolic function ejection fraction 65 to 70%, echocardiogram 4/2024  LV diastolic dysfunction  Left atrial enlargement  Obstructive sleep apnea, severe, on BiPAP, latest sleep study 5/2024.  Restrictive lung disease , latest PFTs 5/2024.  Negative CTA of the chest for pulmonary embolism, 4/2023  Dilated right main pulmonary artery measuring 3.1 cm, CTA 4/2023  Morbid obesity  History of kidney stones post lithotripsy  Polycystic ovarian disease.  Adrenal adenoma.  Chronic sinusitis, history  Past tobacco use  Otherwise as per assessment below.    RECOMMENDATIONS:      Patient's blood pressure is elevated slightly.  She does have palpitations.  Would benefit from Toprol-XL 25 mg daily.  She will continue her other medications as well.  Refills were provided.    Exercise dietary swimming program was encouraged.  Hydration.    [employer] portal use was encouraged.    We will plan to see back in 3 months with ECG as ordered.     Patient will follow up with their primary physician for general care.    The patient knows to contact medical care earlier if need be.      ALLERGIES:     Banana, Kiwi, and Latex     MEDICATIONS:     Current Outpatient Medications   Medication Instructions    irbesartan (AVAPRO) 300 mg, oral, Daily    spironolactone (ALDACTONE) 25 mg, oral, Daily       ELECTROCARDIOGRAM:      None this visit    CARDIAC TESTING:      None this visit    LABORATORY DATA:      CBC:   Lab Results   Component Value Date    WBC 7.7 04/30/2025    RBC 4.70 04/30/2025    HGB 14.1 04/30/2025    HCT 41.9 04/30/2025     04/30/2025        CMP:    Lab Results   Component Value Date     04/30/2025    K 4.1 04/30/2025      04/30/2025    CO2 24 04/30/2025    BUN 18 04/30/2025    CREATININE 0.54 04/30/2025    GLUCOSE 111 (H) 04/30/2025    CALCIUM 10.0 04/30/2025       Magnesium:    Lab Results   Component Value Date    MG 2.2 04/30/2025       Lipid Profile:    Lab Results   Component Value Date    CHOL 175 04/30/2025    TRIG 149 04/30/2025    HDL 40 (L) 04/30/2025    LDLCALC 109 (H) 04/30/2025       Hepatic Function Panel:    Lab Results   Component Value Date    ALKPHOS 104 04/30/2025    ALT 12 04/30/2025    AST 12 04/30/2025    PROT 7.5 04/30/2025    BILITOT 1.7 (H) 04/30/2025       TSH:    Lab Results   Component Value Date    TSH 1.56 04/30/2025     Cardiac Enzymes:    Lab Results   Component Value Date    TROPHS 8 04/13/2024    TROPHS 9 04/13/2024                   PROBLEM LIST:     Problem List[1]          Justo Sheehan MD, FACC   [facility] /  Cardiology      Of Note:  [employer] voice recognition dictation software was utilized partially in the preparation of this note, therefore, inaccuracies in spelling, word choice and punctuation may have occurred which were not recognized at the time of signing.    Patient was seen and examined with total time of visit including chart preparation, rooming, and chart completion exceeding 40 minutes.      Scribe Attestation  By signing my name below, I, Melody Dinero LPN , Scribe attest that this documentation has been prepared under the direction and in the presence of Justo Sheehan MD,FACC.    I, Dr. Justo Sheehan MD, FACC, personally performed the services described in the documentation as scribed by Melody Dinero LPN  in my presence, and confirm it is both accurate and complete.         [1]   Patient Active Problem List  Diagnosis    Postmenopausal bleeding    Hypertension, essential, benign    Adrenal adenoma    BMI 50.0-59.9, adult (Multi)    Hypoxemia    JUVENCIO (obstructive sleep apnea)    Morbid obesity (Multi)    Nephrolithiasis    Acute respiratory failure with  hypoxemia    Chest wall mass    Mass of right axilla    Polycystic ovaries    Hypoxia    Non-restorative sleep    Pulmonary hypertension (Multi)    HEARD (dyspnea on exertion)    Snores    Abnormal EKG    Elevated coronary artery calcium score    Former tobacco use

## 2025-07-15 ENCOUNTER — OFFICE VISIT (OUTPATIENT)
Dept: SLEEP MEDICINE | Facility: CLINIC | Age: 65
End: 2025-07-15
Payer: COMMERCIAL

## 2025-07-15 VITALS
HEIGHT: 64 IN | OXYGEN SATURATION: 97 % | WEIGHT: 291 LBS | BODY MASS INDEX: 49.68 KG/M2 | TEMPERATURE: 98.2 F | HEART RATE: 88 BPM

## 2025-07-15 DIAGNOSIS — E66.01 MORBID OBESITY (MULTI): ICD-10-CM

## 2025-07-15 DIAGNOSIS — G47.33 OSA (OBSTRUCTIVE SLEEP APNEA): Primary | ICD-10-CM

## 2025-07-15 PROCEDURE — 3008F BODY MASS INDEX DOCD: CPT | Performed by: PHYSICIAN ASSISTANT

## 2025-07-15 PROCEDURE — 99214 OFFICE O/P EST MOD 30 MIN: CPT | Performed by: PHYSICIAN ASSISTANT

## 2025-07-15 PROCEDURE — 1036F TOBACCO NON-USER: CPT | Performed by: PHYSICIAN ASSISTANT

## 2025-07-15 ASSESSMENT — ENCOUNTER SYMPTOMS
LOSS OF SENSATION IN FEET: 0
OCCASIONAL FEELINGS OF UNSTEADINESS: 0
DEPRESSION: 0

## 2025-07-15 ASSESSMENT — PAIN SCALES - GENERAL: PAINLEVEL_OUTOF10: 6

## 2025-07-15 ASSESSMENT — LIFESTYLE VARIABLES
AUDIT-C TOTAL SCORE: 1
HOW MANY STANDARD DRINKS CONTAINING ALCOHOL DO YOU HAVE ON A TYPICAL DAY: 1 OR 2
HOW OFTEN DO YOU HAVE A DRINK CONTAINING ALCOHOL: MONTHLY OR LESS
HOW OFTEN DO YOU HAVE SIX OR MORE DRINKS ON ONE OCCASION: NEVER
SKIP TO QUESTIONS 9-10: 1

## 2025-07-15 NOTE — PROGRESS NOTES
Patient: Libertad Rees    [id number]  : 1960 -- AGE 65 y.o.    Provider: Siomara Holloway PA-C     Location [facility] 1   Service Date: 7/15/2025              [facility] Sleep Medicine Clinic  Followup Visit Note    HISTORY OF PRESENT ILLNESS     HISTORY OF PRESENT ILLNESS   Libertad Rees is a 65 y.o. female with h/o JUVENCIO, respiratory failure, HTN, morbid obesity  who presents to a [facility] Sleep Medicine Clinic for followup.     Past Sleep History  Diagnostic PSG, 6/10/2024-  TST 1.7 hours, no N3/REM  *Performed on 1-2L O2 - home setting is currently 2L; was having desaturations while awake  RDI3%: 73.9  RDI4%: 29.6  O2 edgar: 70%     Titration study  -hypoventilation present, improved with BPAP, does not suggest additional O2 needed   -at BPAP 14/10- RDI of 15. And O2 edgar of 90%  -recommendations for Auto BPAP: Max IPAP 14, Min EPAP 6, PS 5          Assessment and plan from last visit: 2025-   JUVENCIO (obstructive sleep apnea) - Primary G47.33        -severe on testing; referred after hospitalization where she was having desaturation  -on 2L continuous O2 since hospitalization; sleep study performedon 1-2 L;; due to short sleep time did not get to therapeutic portion (originally ordered as a split night study) --> titration study shows CO2 retention, improved with BPAP and O2 consistently > 90% on BPAP 14/10  - doing well, meeting compliance at this time, rAHI 0.1  -reiterated to her that bleed in O2 to bpap circuit is not necessary and would not advise to do this as O2 saturations with BPAP already 90% and above with average 94-95%   -Diet, exercise, and weight loss were emphasized today in clinic, as were non-supine sleep, avoiding alcohol in the late evening, and driving or operating heavy machinery when sleepy.           Relevant Orders     Positive Airway Pressure (PAP) Therapy     Morbid obesity (Multi) E66.01       -BMI 50 today  -declines nutrition/wt  mgmt referral - going to first try on her own with tracking  -biarb form 4/2024 is 31  -ABG was ordered prior to titration study -> declined; will monitor on future BMP- order in system from pcp            Acute respiratory failure with hypoxemia (Multi)            Current History    On today's visit, the patient reports here for FUV. Continues to do well on cpap, feels sleep quality improved/more day time energy. No issues with PAP therapy.   No longer on O2 during the day, has been discontinued. Did follow up with [facility] medicine and continues to see Dr. Gross, doing well.   Does report she had some palpitations intermittently over the past several months. Saw cardiology, was recommended she could try metolprolol. States she was a little hesisteant to start and has only had one further episode since she last saw cardiology about 2 months ago, has been several weeks since last episode.       Sleep schedule:  In bed: 10:30-MN  Subjective sleep latency:  30 min, quicker with cpap   Awakenings during night:  2- nocturia   Final awakening time:  8AM  Naps: denies     Overall estimate of total sleep time: 7-8 hour    ESS:  0  HEMAL:  2  FOSQ: 40    REVIEW OF SYSTEMS     REVIEW OF SYSTEMS  See HPI; all other ROS were reviewed and negative for compliant      ALLERGIES AND MEDICATIONS     ALLERGIES  Allergies[1]    MEDICATIONS: She has a current medication list which includes the following prescription(s): irbesartan - Take 1 tablet (300 mg) by mouth once daily, spironolactone - Take 1 tablet (25 mg) by mouth once daily, and metoprolol succinate xl - Take 1 tablet (25 mg) by mouth once daily. Do not crush or chew.    PAST MEDICAL HISTORY : She  has a past medical history of Adrenal adenoma, Adverse effect of anesthesia, Hypertension, Lipoma, Nephrolithiasis, and Post-menopausal bleeding (12/27/2023).    PAST SURGICAL HISTORY: She  has a past surgical history that includes Other surgical history (12/22/2022); Lithotripsy;  "Shoulder surgery (Right); and Lipoma resection.     FAMILY HISTORY: No changes since previous visit. Otherwise non-contributory as charted.     SOCIAL HISTORY  She  reports that she has quit smoking. Her smoking use included cigarettes. She has never used smokeless tobacco. She reports that she does not currently use alcohol. She reports that she does not use drugs.       PHYSICAL EXAM     VITAL SIGNS: Pulse 88   Temp 36.8 °C (98.2 °F) (Temporal)   Ht 1.626 m (5' 4\")   Wt 132 kg (291 lb)   SpO2 97%   BMI 49.95 kg/m²        PREVIOUS WEIGHTS:  Wt Readings from Last 3 Encounters:   07/15/25 132 kg (291 lb)   05/07/25 127 kg (280 lb 9.6 oz)   04/03/25 129 kg (284 lb)       Constitutional: Alert and oriented, cooperative, no acute distress  Head: Normocephalic, atraumatic   Cranial Features: No abnormal craniofacial features  Neck: Supple. Trachea midline.  Pulmonary: Non-labored breathing, speaks in full sentences. No cough.    Cardiac: regular rate   Extremities: No clubbing, no edema  Neuromuscular: Cranial nerves grossly intact, no focal deficits      RESULTS/DATA     CARBON DIOXIDE (mmol/L)   Date Value   04/30/2025 24     Bicarbonate (mmol/L)   Date Value   04/14/2024 36 (H)   04/13/2024 31   04/11/2024 31       PAP Adherence  PAP Download reviewed?: A PAP adherence download was obtained and data was reviewed personally today in clinic., Screenshot of PAP download is attached below        ASSESSMENT/PLAN     Ms. Rees is a 65 y.o. female and she returns in followup to the [facility] Sleep Medicine Clinic for JUVENCIO.    Problem List, Orders, Assessment, Recommendations:  Problem List Items Addressed This Visit           ICD-10-CM    JUVENCIO (obstructive sleep apnea) - Primary G47.33    -severe on testing; referred after hospitalization where she was having desaturation  - titration study shows CO2 retention, improved with BPAP and O2 consistently > 90% on BPAP 14/10  - doing well, meeting compliance at this " time, rAHI 0.1  -stopped O2 bleed in, was also discontinued from daytime O2, do not believe she needs any nocturnal O2 based on O2 sats in titration study and good JUVENCIO control  -given recent palpitations, we will ensure O2 is at target and will obtain overnight POX through [facility].  -Diet, exercise, and weight loss were emphasized today in clinic, as were non-supine sleep, avoiding alcohol in the late evening, and driving or operating heavy machinery when sleepy.         Relevant Orders    Positive Airway Pressure (PAP) Therapy    Pulse oximetry, overnight    Morbid obesity (Multi) E66.01    -BMI ~50 today  -declines nutrition/wt mgmt referral - working on this on her own  -cardiology suggested water exercises, she is thinking about this   -biarb form 4/2024 is 31 --> down to 24 in 4/2025   -ABG previously ordered prior to titration study and was declined             -follows with Dr. Donovan- pulmonary medicine, doing well, no longer uses any daytime O2     Disposition    Return to clinic in 12 months  -sooner as needed                 [1]   Allergies  Allergen Reactions    Banana Unknown    Kiwi Unknown    Latex Rash

## 2025-07-15 NOTE — PATIENT INSTRUCTIONS
[facility] Sleep Medicine  [facility] [address] [facility] MEDICAL [facility] [address]  [address] [facility] [address]       NAME: Libertad Rees   DATE: 07/15/25    Your Sleep Provider Today: Siomara Holloway PA-C  Your Primary Care Physician: Stephenie Levine, DO   Your Referring Provider: No ref. provider found    DIAGNOSIS:   1. JUVENCOI (obstructive sleep apnea)  Positive Airway Pressure (PAP) Therapy    Pulse oximetry, overnight      2. Morbid obesity (Multi)            Thank you for coming to the Sleep Medicine Clinic today! Your sleep medicine provider today was: Siomara Holloway PA-C Below is a summary of your treatment plan, other important information, and our contact numbers:      TREATMENT PLAN     Nice to see you today.  Overnight oximetry was ordered through [facility]. To be done with you BPAP on.     Instructions - Common JUVENCIO Recs: - For your sleep apnea, continue to use your PAP every night and use it whenever you are sleeping.   - Avoid alcohol or sedatives several hours prior to sleeping.   - Get additional supplies for your PAP (e.g., mask, hose, filters) every 3 months or as your insurance allows from your [employer] company. Replacement cushions for your PAP mask can be requested monthly if airseals are an issue.  - Remember to clean your mask, tubings, and water chamber regularly as instructed.  - Avoid driving or operating heavy machinery when drowsy. A person driving while sleepy is five (5) times more likely to have an accident. If you feel sleepy, pull over and take a short power nap (sleep for less than 30 minutes). Otherwise, ask somebody to drive you.      Return to clinic 1 year       IMPORTANT INFORMATION     Call 911 for medical emergencies.  Our offices are generally open from Monday-Friday, 9 am - 5 pm.  If you need to get in touch with me, you may either call me and my team(number is below) or you can use [employer].  If a referral for a test, for CPAP, or for another specialist was made,  and you have not heard about scheduling this within a week, please call scheduling at [phone]).  If you are unable to make your appointment for clinic or an overnight study, kindly call the office at least 48 hours in advance to cancel and reschedule.  If you are on CPAP, please bring your device's card or the device to each clinic appointment.   There are no supporting services by either the sleep doctors or their staff on weekends and Holidays, or after 5 PM on weekdays.   If you have been asked to come to a sleep study, make sure you bring toiletries, a comfy pillow, and any nighttime medications that you may regularly take. Also be sure to eat dinner before you arrive. We generally do not provide meals.      PRESCRIPTIONS     We require 7 days advanced notice for prescription refills. If we do not receive the request in this time, we cannot guarantee that your medication will be refilled in time.      IMPORTANT PHONE NUMBERS     Sleep Medicine Clinic Fax: [phone]  Appointments (for Adult Sleep Clinic): [phone] - option 2  Appointments (For Sleep Studies): [phone] - option 3  [employer] ([employer]): [phone]  Behavioral Sleep Medicine: [phone]  Sleep Surgery: [phone]  ENT (Otolaryngology): [phone]  Headache Clinic (Neurology): [phone]  Neurology: [phone]  Psychiatry: [phone]  Pulmonary Function Testing (PFT) [place]: [phone]  Pulmonary Medicine: [phone]    [employer] ([employer]): [phone]  "[employer] ([employer]): [phone]  [facility] ([facility]): [phone]      OUR ADULT SLEEP MEDICINE TEAM   Please do not hesitate to call the office or sleep nurse with any questions between appointments:    Adult Sleep Nurses (Earlene Larios RN and Danii Avalos RN):  For clinical questions and refilling prescriptions: [phone]  Email sleep diaries and other documents at:  [email or web address]    Adult Sleep Medicine Secretaries:  Mirella Montez (For Jose/Watson/Krise/Strohl/Yeh/Green):   P: [phone]  F: [phone]  Theodora Sutton (For Collazo/Dorys): P: [phone]  Fax: [phone]  Edna Cat (For Jurcevic/Blank): P: [phone]  F: [phone]  Beatriz Jose (For Breanne): P: [phone]  F: [phone]  Khloe Mathur (For Ana Maria/Earl/Zakhary): P: [phone]  F: [phone]  Nery Murray (For Te/Nestor): P: [phone]  F: [phone]     Adult Sleep Medicine Advanced Practice Providers:  Kishor Jim ([place], [place])  Sis Elliott ([facility])  Jillian Saul CNP ([place], [place], [place])  Katerine Holloway CNP ([place], [place], [facility])  Socorro Baez CNP ([facility])        OUR SLEEP TESTING LOCATIONS     Our team will contact you to schedule your sleep study, however, you can contact us as follow:  Main Phone Line (scheduling only): [phone]), option 3  Adult and Pediatric Locations  [facility] (6 years and older): [facility] - 4th floor ([address] [place] [address]) After hours line: [phone]  [facility] at [facility] (Main campus: All ages): [facility], 6th floor. After hours line: [phone]   [place] (5 years and older; younger considered on case-by-case basis): [address]; [address], [address]. Scheduling  After hours line: [phone]   [place] (6 years and older): [address]; [address]; [address]   [place] (6 years and older): [address] [facility], [address]  After hours line: [phone]   [place] (13 years and older) in [place]: [address], 2nd floor  After hours line: [phone]   [place] (13 year and older): [address] [facility] [address] [address]  After hours line: [phone]     Adult Only Locations:   [place] (18 years and older): [address] [place] [address], [facility]  "floor   [place] (18 years and older): [address] [facility]; 4th floor  After hours line: [phone]   [place] (18 years and older) at [place]: [address] [facility]  After hours line: [phone]          CONTACTING YOUR SLEEP MEDICINE PROVIDER     Send a message directly to your provider through \"My Chart\", which is the email service through your  Records Account: [email or web address]   Call [phone] and leave a message. One of the administrative assistants will forward the message to your sleep medicine provider through \"My Chart\" and/or email.     Your sleep medicine provider for this visit was: Siomara Holloway PA-C    "

## 2025-07-15 NOTE — ASSESSMENT & PLAN NOTE
-severe on testing; referred after hospitalization where she was having desaturation  - titration study shows CO2 retention, improved with BPAP and O2 consistently > 90% on BPAP 14/10  - doing well, meeting compliance at this time, rAHI 0.1  -stopped O2 bleed in, was also discontinued from daytime O2, do not believe she needs any nocturnal O2 based on O2 sats in titration study and good JUVENCIO control  -given recent palpitations, we will ensure O2 is at target and will obtain overnight POX through [facility].  -Diet, exercise, and weight loss were emphasized today in clinic, as were non-supine sleep, avoiding alcohol in the late evening, and driving or operating heavy machinery when sleepy.

## 2025-07-15 NOTE — ASSESSMENT & PLAN NOTE
-BMI ~50 today  -declines nutrition/wt mgmt referral - working on this on her own  -cardiology suggested water exercises, she is thinking about this   -biarb form 4/2024 is 31 --> down to 24 in 4/2025   -ABG previously ordered prior to titration study and was declined

## 2025-08-06 DIAGNOSIS — G47.33 OSA (OBSTRUCTIVE SLEEP APNEA): Primary | ICD-10-CM

## 2025-08-07 ENCOUNTER — APPOINTMENT (OUTPATIENT)
Dept: CARDIOLOGY | Facility: CLINIC | Age: 65
End: 2025-08-07
Payer: COMMERCIAL

## 2025-08-11 ENCOUNTER — APPOINTMENT (OUTPATIENT)
Facility: CLINIC | Age: 65
End: 2025-08-11
Payer: COMMERCIAL

## 2025-08-11 VITALS
BODY MASS INDEX: 49.82 KG/M2 | RESPIRATION RATE: 18 BRPM | TEMPERATURE: 97.8 F | OXYGEN SATURATION: 95 % | HEIGHT: 64 IN | DIASTOLIC BLOOD PRESSURE: 88 MMHG | HEART RATE: 80 BPM | WEIGHT: 291.8 LBS | SYSTOLIC BLOOD PRESSURE: 152 MMHG

## 2025-08-11 DIAGNOSIS — G47.33 OSA (OBSTRUCTIVE SLEEP APNEA): Primary | ICD-10-CM

## 2025-08-11 PROCEDURE — 3077F SYST BP >= 140 MM HG: CPT | Performed by: INTERNAL MEDICINE

## 2025-08-11 PROCEDURE — 3008F BODY MASS INDEX DOCD: CPT | Performed by: INTERNAL MEDICINE

## 2025-08-11 PROCEDURE — 3079F DIAST BP 80-89 MM HG: CPT | Performed by: INTERNAL MEDICINE

## 2025-08-11 PROCEDURE — 99213 OFFICE O/P EST LOW 20 MIN: CPT | Performed by: INTERNAL MEDICINE

## 2025-08-11 ASSESSMENT — COLUMBIA-SUICIDE SEVERITY RATING SCALE - C-SSRS
2. HAVE YOU ACTUALLY HAD ANY THOUGHTS OF KILLING YOURSELF?: NO
6. HAVE YOU EVER DONE ANYTHING, STARTED TO DO ANYTHING, OR PREPARED TO DO ANYTHING TO END YOUR LIFE?: NO
1. IN THE PAST MONTH, HAVE YOU WISHED YOU WERE DEAD OR WISHED YOU COULD GO TO SLEEP AND NOT WAKE UP?: NO

## 2025-10-02 ENCOUNTER — APPOINTMENT (OUTPATIENT)
Dept: CARDIOLOGY | Facility: CLINIC | Age: 65
End: 2025-10-02
Payer: COMMERCIAL

## 2026-02-11 ENCOUNTER — APPOINTMENT (OUTPATIENT)
Facility: CLINIC | Age: 66
End: 2026-02-11
Payer: COMMERCIAL